# Patient Record
Sex: FEMALE | Race: WHITE | Employment: OTHER | URBAN - NONMETROPOLITAN AREA
[De-identification: names, ages, dates, MRNs, and addresses within clinical notes are randomized per-mention and may not be internally consistent; named-entity substitution may affect disease eponyms.]

---

## 2017-01-24 ENCOUNTER — TELEPHONE (OUTPATIENT)
Dept: FAMILY MEDICINE CLINIC | Age: 62
End: 2017-01-24

## 2017-01-24 DIAGNOSIS — Z00.00 WELL ADULT HEALTH CHECK: Primary | ICD-10-CM

## 2017-02-14 ENCOUNTER — OFFICE VISIT (OUTPATIENT)
Dept: FAMILY MEDICINE CLINIC | Age: 62
End: 2017-02-14

## 2017-02-14 VITALS
HEIGHT: 64 IN | WEIGHT: 257 LBS | HEART RATE: 88 BPM | DIASTOLIC BLOOD PRESSURE: 60 MMHG | BODY MASS INDEX: 43.87 KG/M2 | SYSTOLIC BLOOD PRESSURE: 140 MMHG

## 2017-02-14 DIAGNOSIS — R73.9 ELEVATED BLOOD SUGAR: Primary | ICD-10-CM

## 2017-02-14 DIAGNOSIS — E03.4 HYPOTHYROIDISM DUE TO ACQUIRED ATROPHY OF THYROID: ICD-10-CM

## 2017-02-14 DIAGNOSIS — F32.89 OTHER DEPRESSION: ICD-10-CM

## 2017-02-14 PROCEDURE — G8484 FLU IMMUNIZE NO ADMIN: HCPCS | Performed by: FAMILY MEDICINE

## 2017-02-14 PROCEDURE — 99213 OFFICE O/P EST LOW 20 MIN: CPT | Performed by: FAMILY MEDICINE

## 2017-02-14 PROCEDURE — 1036F TOBACCO NON-USER: CPT | Performed by: FAMILY MEDICINE

## 2017-02-14 PROCEDURE — G8417 CALC BMI ABV UP PARAM F/U: HCPCS | Performed by: FAMILY MEDICINE

## 2017-02-14 PROCEDURE — 3017F COLORECTAL CA SCREEN DOC REV: CPT | Performed by: FAMILY MEDICINE

## 2017-02-14 PROCEDURE — G8427 DOCREV CUR MEDS BY ELIG CLIN: HCPCS | Performed by: FAMILY MEDICINE

## 2017-02-14 PROCEDURE — 3014F SCREEN MAMMO DOC REV: CPT | Performed by: FAMILY MEDICINE

## 2017-02-14 ASSESSMENT — ENCOUNTER SYMPTOMS
SHORTNESS OF BREATH: 0
GASTROINTESTINAL NEGATIVE: 1
RESPIRATORY NEGATIVE: 1

## 2017-03-14 ENCOUNTER — OFFICE VISIT (OUTPATIENT)
Dept: FAMILY MEDICINE CLINIC | Age: 62
End: 2017-03-14

## 2017-03-14 VITALS
HEART RATE: 84 BPM | TEMPERATURE: 99.3 F | BODY MASS INDEX: 42.1 KG/M2 | WEIGHT: 246.6 LBS | DIASTOLIC BLOOD PRESSURE: 74 MMHG | HEIGHT: 64 IN | SYSTOLIC BLOOD PRESSURE: 136 MMHG

## 2017-03-14 DIAGNOSIS — R10.13 EPIGASTRIC PAIN: Primary | ICD-10-CM

## 2017-03-14 DIAGNOSIS — K29.50 CHRONIC GASTRITIS WITHOUT BLEEDING, UNSPECIFIED GASTRITIS TYPE: ICD-10-CM

## 2017-03-14 PROCEDURE — G8417 CALC BMI ABV UP PARAM F/U: HCPCS | Performed by: FAMILY MEDICINE

## 2017-03-14 PROCEDURE — 3014F SCREEN MAMMO DOC REV: CPT | Performed by: FAMILY MEDICINE

## 2017-03-14 PROCEDURE — G8484 FLU IMMUNIZE NO ADMIN: HCPCS | Performed by: FAMILY MEDICINE

## 2017-03-14 PROCEDURE — G8427 DOCREV CUR MEDS BY ELIG CLIN: HCPCS | Performed by: FAMILY MEDICINE

## 2017-03-14 PROCEDURE — 99213 OFFICE O/P EST LOW 20 MIN: CPT | Performed by: FAMILY MEDICINE

## 2017-03-14 PROCEDURE — 1036F TOBACCO NON-USER: CPT | Performed by: FAMILY MEDICINE

## 2017-03-14 PROCEDURE — 3017F COLORECTAL CA SCREEN DOC REV: CPT | Performed by: FAMILY MEDICINE

## 2017-03-14 RX ORDER — OMEPRAZOLE 40 MG/1
40 CAPSULE, DELAYED RELEASE ORAL DAILY
Qty: 30 CAPSULE | Refills: 1 | Status: SHIPPED | OUTPATIENT
Start: 2017-03-14 | End: 2017-09-19 | Stop reason: SDUPTHER

## 2017-03-21 ENCOUNTER — OFFICE VISIT (OUTPATIENT)
Dept: FAMILY MEDICINE CLINIC | Age: 62
End: 2017-03-21

## 2017-03-21 VITALS
HEART RATE: 72 BPM | BODY MASS INDEX: 41.66 KG/M2 | HEIGHT: 64 IN | DIASTOLIC BLOOD PRESSURE: 72 MMHG | WEIGHT: 244 LBS | SYSTOLIC BLOOD PRESSURE: 148 MMHG

## 2017-03-21 DIAGNOSIS — E03.4 HYPOTHYROIDISM DUE TO ACQUIRED ATROPHY OF THYROID: ICD-10-CM

## 2017-03-21 PROCEDURE — 3014F SCREEN MAMMO DOC REV: CPT | Performed by: FAMILY MEDICINE

## 2017-03-21 PROCEDURE — 3017F COLORECTAL CA SCREEN DOC REV: CPT | Performed by: FAMILY MEDICINE

## 2017-03-21 PROCEDURE — G8417 CALC BMI ABV UP PARAM F/U: HCPCS | Performed by: FAMILY MEDICINE

## 2017-03-21 PROCEDURE — 99213 OFFICE O/P EST LOW 20 MIN: CPT | Performed by: FAMILY MEDICINE

## 2017-03-21 PROCEDURE — G8427 DOCREV CUR MEDS BY ELIG CLIN: HCPCS | Performed by: FAMILY MEDICINE

## 2017-03-21 PROCEDURE — G8484 FLU IMMUNIZE NO ADMIN: HCPCS | Performed by: FAMILY MEDICINE

## 2017-03-21 PROCEDURE — 1036F TOBACCO NON-USER: CPT | Performed by: FAMILY MEDICINE

## 2017-03-21 RX ORDER — LEVOTHYROXINE AND LIOTHYRONINE 76; 18 UG/1; UG/1
120 TABLET ORAL DAILY
Qty: 90 TABLET | Refills: 1 | Status: SHIPPED | OUTPATIENT
Start: 2017-03-21 | End: 2017-08-24 | Stop reason: CLARIF

## 2017-03-21 RX ORDER — PAROXETINE HYDROCHLORIDE 40 MG/1
40 TABLET, FILM COATED ORAL EVERY MORNING
Qty: 100 TABLET | Refills: 1 | Status: SHIPPED | OUTPATIENT
Start: 2017-03-21 | End: 2017-09-19 | Stop reason: SDUPTHER

## 2017-03-21 ASSESSMENT — ENCOUNTER SYMPTOMS
GASTROINTESTINAL NEGATIVE: 1
RESPIRATORY NEGATIVE: 1
SHORTNESS OF BREATH: 0

## 2017-04-05 LAB
AVERAGE GLUCOSE: 99 MG/DL (ref 70–126)
HBA1C MFR BLD: 5.3 % (ref 4.4–6.4)

## 2017-08-23 ENCOUNTER — TELEPHONE (OUTPATIENT)
Dept: FAMILY MEDICINE CLINIC | Age: 62
End: 2017-08-23

## 2017-08-24 RX ORDER — LEVOTHYROXINE AND LIOTHYRONINE 76; 18 UG/1; UG/1
120 TABLET ORAL DAILY
Qty: 30 TABLET | Refills: 3 | Status: SHIPPED | OUTPATIENT
Start: 2017-08-24 | End: 2017-09-19 | Stop reason: SDUPTHER

## 2017-09-19 ENCOUNTER — OFFICE VISIT (OUTPATIENT)
Dept: FAMILY MEDICINE CLINIC | Age: 62
End: 2017-09-19
Payer: COMMERCIAL

## 2017-09-19 VITALS
WEIGHT: 257.4 LBS | BODY MASS INDEX: 43.94 KG/M2 | HEIGHT: 64 IN | HEART RATE: 88 BPM | SYSTOLIC BLOOD PRESSURE: 146 MMHG | DIASTOLIC BLOOD PRESSURE: 70 MMHG

## 2017-09-19 DIAGNOSIS — F32.5 MAJOR DEPRESSIVE DISORDER WITH SINGLE EPISODE, IN FULL REMISSION (HCC): ICD-10-CM

## 2017-09-19 DIAGNOSIS — E03.4 HYPOTHYROIDISM DUE TO ACQUIRED ATROPHY OF THYROID: Primary | ICD-10-CM

## 2017-09-19 PROCEDURE — 1036F TOBACCO NON-USER: CPT | Performed by: FAMILY MEDICINE

## 2017-09-19 PROCEDURE — 3017F COLORECTAL CA SCREEN DOC REV: CPT | Performed by: FAMILY MEDICINE

## 2017-09-19 PROCEDURE — 3014F SCREEN MAMMO DOC REV: CPT | Performed by: FAMILY MEDICINE

## 2017-09-19 PROCEDURE — G8417 CALC BMI ABV UP PARAM F/U: HCPCS | Performed by: FAMILY MEDICINE

## 2017-09-19 PROCEDURE — 99213 OFFICE O/P EST LOW 20 MIN: CPT | Performed by: FAMILY MEDICINE

## 2017-09-19 PROCEDURE — G8427 DOCREV CUR MEDS BY ELIG CLIN: HCPCS | Performed by: FAMILY MEDICINE

## 2017-09-19 RX ORDER — PAROXETINE HYDROCHLORIDE 40 MG/1
40 TABLET, FILM COATED ORAL EVERY MORNING
Qty: 100 TABLET | Refills: 1 | Status: SHIPPED | OUTPATIENT
Start: 2017-09-19 | End: 2018-03-20 | Stop reason: SDUPTHER

## 2017-09-19 RX ORDER — OMEPRAZOLE 40 MG/1
40 CAPSULE, DELAYED RELEASE ORAL DAILY
Qty: 30 CAPSULE | Refills: 1 | Status: SHIPPED | OUTPATIENT
Start: 2017-09-19 | End: 2018-09-18

## 2017-09-19 RX ORDER — LEVOTHYROXINE AND LIOTHYRONINE 76; 18 UG/1; UG/1
120 TABLET ORAL DAILY
Qty: 30 TABLET | Refills: 3 | Status: SHIPPED | OUTPATIENT
Start: 2017-09-19 | End: 2018-03-20 | Stop reason: SDUPTHER

## 2017-09-19 ASSESSMENT — PATIENT HEALTH QUESTIONNAIRE - PHQ9
SUM OF ALL RESPONSES TO PHQ9 QUESTIONS 1 & 2: 2
SUM OF ALL RESPONSES TO PHQ QUESTIONS 1-9: 2
2. FEELING DOWN, DEPRESSED OR HOPELESS: 1
1. LITTLE INTEREST OR PLEASURE IN DOING THINGS: 1

## 2017-09-19 ASSESSMENT — ENCOUNTER SYMPTOMS
RESPIRATORY NEGATIVE: 1
GASTROINTESTINAL NEGATIVE: 1
SHORTNESS OF BREATH: 0

## 2017-09-26 ENCOUNTER — TELEPHONE (OUTPATIENT)
Dept: FAMILY MEDICINE CLINIC | Age: 62
End: 2017-09-26

## 2017-09-26 LAB — TSH SERPL DL<=0.05 MIU/L-ACNC: 0.59 UIU/ML (ref 0.49–4.67)

## 2018-01-05 DIAGNOSIS — J32.9 CHRONIC SINUSITIS, UNSPECIFIED LOCATION: Primary | ICD-10-CM

## 2018-01-05 RX ORDER — PROMETHAZINE HYDROCHLORIDE AND CODEINE PHOSPHATE 6.25; 1 MG/5ML; MG/5ML
SYRUP ORAL
Qty: 120 ML | Refills: 0 | Status: SHIPPED | OUTPATIENT
Start: 2018-01-05 | End: 2018-01-19

## 2018-03-20 ENCOUNTER — OFFICE VISIT (OUTPATIENT)
Dept: FAMILY MEDICINE CLINIC | Age: 63
End: 2018-03-20
Payer: COMMERCIAL

## 2018-03-20 VITALS
SYSTOLIC BLOOD PRESSURE: 146 MMHG | HEART RATE: 80 BPM | WEIGHT: 262 LBS | HEIGHT: 64 IN | BODY MASS INDEX: 44.73 KG/M2 | DIASTOLIC BLOOD PRESSURE: 80 MMHG

## 2018-03-20 DIAGNOSIS — E03.4 HYPOTHYROIDISM DUE TO ACQUIRED ATROPHY OF THYROID: ICD-10-CM

## 2018-03-20 DIAGNOSIS — F32.5 MAJOR DEPRESSIVE DISORDER WITH SINGLE EPISODE, IN FULL REMISSION (HCC): ICD-10-CM

## 2018-03-20 PROCEDURE — G8484 FLU IMMUNIZE NO ADMIN: HCPCS | Performed by: FAMILY MEDICINE

## 2018-03-20 PROCEDURE — 1036F TOBACCO NON-USER: CPT | Performed by: FAMILY MEDICINE

## 2018-03-20 PROCEDURE — 99213 OFFICE O/P EST LOW 20 MIN: CPT | Performed by: FAMILY MEDICINE

## 2018-03-20 PROCEDURE — 3014F SCREEN MAMMO DOC REV: CPT | Performed by: FAMILY MEDICINE

## 2018-03-20 PROCEDURE — G8427 DOCREV CUR MEDS BY ELIG CLIN: HCPCS | Performed by: FAMILY MEDICINE

## 2018-03-20 PROCEDURE — G8417 CALC BMI ABV UP PARAM F/U: HCPCS | Performed by: FAMILY MEDICINE

## 2018-03-20 PROCEDURE — 3017F COLORECTAL CA SCREEN DOC REV: CPT | Performed by: FAMILY MEDICINE

## 2018-03-20 RX ORDER — LEVOTHYROXINE AND LIOTHYRONINE 76; 18 UG/1; UG/1
120 TABLET ORAL DAILY
Qty: 30 TABLET | Refills: 5 | Status: SHIPPED | OUTPATIENT
Start: 2018-03-20 | End: 2018-09-18 | Stop reason: SDUPTHER

## 2018-03-20 RX ORDER — PAROXETINE HYDROCHLORIDE 40 MG/1
40 TABLET, FILM COATED ORAL EVERY MORNING
Qty: 100 TABLET | Refills: 1 | Status: SHIPPED | OUTPATIENT
Start: 2018-03-20 | End: 2018-09-18 | Stop reason: SDUPTHER

## 2018-03-20 ASSESSMENT — ENCOUNTER SYMPTOMS
GASTROINTESTINAL NEGATIVE: 1
RESPIRATORY NEGATIVE: 1
SHORTNESS OF BREATH: 0

## 2018-09-18 ENCOUNTER — OFFICE VISIT (OUTPATIENT)
Dept: FAMILY MEDICINE CLINIC | Age: 63
End: 2018-09-18
Payer: COMMERCIAL

## 2018-09-18 VITALS
WEIGHT: 260 LBS | HEART RATE: 80 BPM | DIASTOLIC BLOOD PRESSURE: 68 MMHG | SYSTOLIC BLOOD PRESSURE: 152 MMHG | HEIGHT: 64 IN | BODY MASS INDEX: 44.39 KG/M2

## 2018-09-18 DIAGNOSIS — F32.5 MAJOR DEPRESSIVE DISORDER WITH SINGLE EPISODE, IN FULL REMISSION (HCC): ICD-10-CM

## 2018-09-18 DIAGNOSIS — E03.4 HYPOTHYROIDISM DUE TO ACQUIRED ATROPHY OF THYROID: Primary | ICD-10-CM

## 2018-09-18 PROCEDURE — G8417 CALC BMI ABV UP PARAM F/U: HCPCS | Performed by: FAMILY MEDICINE

## 2018-09-18 PROCEDURE — 1036F TOBACCO NON-USER: CPT | Performed by: FAMILY MEDICINE

## 2018-09-18 PROCEDURE — 99213 OFFICE O/P EST LOW 20 MIN: CPT | Performed by: FAMILY MEDICINE

## 2018-09-18 PROCEDURE — G8427 DOCREV CUR MEDS BY ELIG CLIN: HCPCS | Performed by: FAMILY MEDICINE

## 2018-09-18 PROCEDURE — 3017F COLORECTAL CA SCREEN DOC REV: CPT | Performed by: FAMILY MEDICINE

## 2018-09-18 RX ORDER — LEVOTHYROXINE AND LIOTHYRONINE 76; 18 UG/1; UG/1
120 TABLET ORAL DAILY
Qty: 30 TABLET | Refills: 5 | Status: SHIPPED | OUTPATIENT
Start: 2018-09-18 | End: 2019-02-18 | Stop reason: SDUPTHER

## 2018-09-18 RX ORDER — PAROXETINE HYDROCHLORIDE 40 MG/1
40 TABLET, FILM COATED ORAL EVERY MORNING
Qty: 100 TABLET | Refills: 1 | Status: SHIPPED | OUTPATIENT
Start: 2018-09-18 | End: 2019-03-18 | Stop reason: SDUPTHER

## 2018-09-18 ASSESSMENT — ENCOUNTER SYMPTOMS
SHORTNESS OF BREATH: 0
GASTROINTESTINAL NEGATIVE: 1
RESPIRATORY NEGATIVE: 1
ABDOMINAL PAIN: 0

## 2018-12-20 DIAGNOSIS — J32.9 CHRONIC SINUSITIS, UNSPECIFIED LOCATION: ICD-10-CM

## 2018-12-20 RX ORDER — PROMETHAZINE HYDROCHLORIDE AND CODEINE PHOSPHATE 6.25; 1 MG/5ML; MG/5ML
SYRUP ORAL
Qty: 120 ML | Refills: 0 | OUTPATIENT
Start: 2018-12-20 | End: 2019-01-03

## 2018-12-20 NOTE — TELEPHONE ENCOUNTER
Jazzy Nino called requesting a refill on the following medications:  Requested Prescriptions     Pending Prescriptions Disp Refills    promethazine-codeine (PHENERGAN WITH CODEINE) 6.25-10 MG/5ML syrup 120 mL 0     Si-2 tsp Q 6 Hours PRN cough. Pharmacy verified: ddd  . pv      Date of last visit:   Date of next visit (if applicable): 3/50/4839

## 2019-02-18 DIAGNOSIS — E03.4 HYPOTHYROIDISM DUE TO ACQUIRED ATROPHY OF THYROID: ICD-10-CM

## 2019-02-18 RX ORDER — LEVOTHYROXINE AND LIOTHYRONINE 76; 18 UG/1; UG/1
120 TABLET ORAL DAILY
Qty: 30 TABLET | Refills: 5 | Status: SHIPPED | OUTPATIENT
Start: 2019-02-18 | End: 2019-03-18 | Stop reason: SDUPTHER

## 2019-03-18 ENCOUNTER — TELEPHONE (OUTPATIENT)
Dept: FAMILY MEDICINE CLINIC | Age: 64
End: 2019-03-18

## 2019-03-18 ENCOUNTER — OFFICE VISIT (OUTPATIENT)
Dept: FAMILY MEDICINE CLINIC | Age: 64
End: 2019-03-18
Payer: COMMERCIAL

## 2019-03-18 VITALS
BODY MASS INDEX: 44.22 KG/M2 | HEART RATE: 84 BPM | HEIGHT: 64 IN | WEIGHT: 259 LBS | SYSTOLIC BLOOD PRESSURE: 146 MMHG | DIASTOLIC BLOOD PRESSURE: 72 MMHG

## 2019-03-18 DIAGNOSIS — F32.5 MAJOR DEPRESSIVE DISORDER WITH SINGLE EPISODE, IN FULL REMISSION (HCC): ICD-10-CM

## 2019-03-18 DIAGNOSIS — E03.4 HYPOTHYROIDISM DUE TO ACQUIRED ATROPHY OF THYROID: ICD-10-CM

## 2019-03-18 LAB — TSH SERPL DL<=0.05 MIU/L-ACNC: 1.84 UIU/ML (ref 0.4–4.2)

## 2019-03-18 PROCEDURE — 36415 COLL VENOUS BLD VENIPUNCTURE: CPT | Performed by: FAMILY MEDICINE

## 2019-03-18 PROCEDURE — 99213 OFFICE O/P EST LOW 20 MIN: CPT | Performed by: FAMILY MEDICINE

## 2019-03-18 RX ORDER — PAROXETINE HYDROCHLORIDE 40 MG/1
40 TABLET, FILM COATED ORAL EVERY MORNING
Qty: 100 TABLET | Refills: 1 | Status: SHIPPED | OUTPATIENT
Start: 2019-03-18 | End: 2019-09-16 | Stop reason: SDUPTHER

## 2019-03-18 RX ORDER — NEOMYCIN SULFATE, POLYMYXIN B SULFATE, HYDROCORTISONE 3.5; 10000; 1 MG/ML; [USP'U]/ML; MG/ML
1 SOLUTION/ DROPS AURICULAR (OTIC) EVERY 8 HOURS PRN
Qty: 1 BOTTLE | Refills: 1 | Status: SHIPPED | OUTPATIENT
Start: 2019-03-18 | End: 2020-06-10 | Stop reason: SDUPTHER

## 2019-03-18 RX ORDER — LEVOTHYROXINE AND LIOTHYRONINE 76; 18 UG/1; UG/1
120 TABLET ORAL DAILY
Qty: 30 TABLET | Refills: 5 | Status: SHIPPED | OUTPATIENT
Start: 2019-03-18 | End: 2019-09-16 | Stop reason: SDUPTHER

## 2019-03-18 ASSESSMENT — PATIENT HEALTH QUESTIONNAIRE - PHQ9
SUM OF ALL RESPONSES TO PHQ QUESTIONS 1-9: 0
1. LITTLE INTEREST OR PLEASURE IN DOING THINGS: 0
SUM OF ALL RESPONSES TO PHQ9 QUESTIONS 1 & 2: 0
2. FEELING DOWN, DEPRESSED OR HOPELESS: 0
SUM OF ALL RESPONSES TO PHQ QUESTIONS 1-9: 0

## 2019-03-18 ASSESSMENT — ENCOUNTER SYMPTOMS
GASTROINTESTINAL NEGATIVE: 1
SHORTNESS OF BREATH: 0
RESPIRATORY NEGATIVE: 1

## 2019-04-22 ENCOUNTER — OFFICE VISIT (OUTPATIENT)
Dept: FAMILY MEDICINE CLINIC | Age: 64
End: 2019-04-22
Payer: COMMERCIAL

## 2019-04-22 VITALS
WEIGHT: 261.4 LBS | DIASTOLIC BLOOD PRESSURE: 70 MMHG | HEIGHT: 64 IN | SYSTOLIC BLOOD PRESSURE: 142 MMHG | BODY MASS INDEX: 44.63 KG/M2 | TEMPERATURE: 98.2 F | HEART RATE: 80 BPM

## 2019-04-22 DIAGNOSIS — J01.90 ACUTE BACTERIAL SINUSITIS: Primary | ICD-10-CM

## 2019-04-22 DIAGNOSIS — B96.89 ACUTE BACTERIAL SINUSITIS: Primary | ICD-10-CM

## 2019-04-22 PROCEDURE — 99213 OFFICE O/P EST LOW 20 MIN: CPT | Performed by: FAMILY MEDICINE

## 2019-04-22 RX ORDER — CLINDAMYCIN HYDROCHLORIDE 300 MG/1
300 CAPSULE ORAL 2 TIMES DAILY
Qty: 20 CAPSULE | Refills: 0 | Status: SHIPPED | OUTPATIENT
Start: 2019-04-22 | End: 2019-05-02

## 2019-04-23 NOTE — PROGRESS NOTES
Name:  Searcy Phalen  :    1955    Chief Complaint   Patient presents with    Sinusitis     with cough for 12 days       HPI:  URI followed by head congestion and cough. No SOB. No fever. Miserable. Affecting sleep. Physical Exam:      BP (!) 142/70   Pulse 80   Temp 98.2 °F (36.8 °C) (Oral)   Ht 5' 4\" (1.626 m)   Wt 261 lb 6.4 oz (118.6 kg)   BMI 44.87 kg/m²     Not ill. ENT:   TM's clear. Phar is red with edema. No nodes. Lungs are clear. Heart in RRR with no murmur. Assessment/Plan:      Sinusitis    Cleocin    There are no diagnoses linked to this encounter.

## 2019-08-06 ENCOUNTER — OFFICE VISIT (OUTPATIENT)
Dept: FAMILY MEDICINE CLINIC | Age: 64
End: 2019-08-06
Payer: COMMERCIAL

## 2019-08-06 VITALS
HEIGHT: 64 IN | WEIGHT: 261 LBS | SYSTOLIC BLOOD PRESSURE: 138 MMHG | HEART RATE: 89 BPM | DIASTOLIC BLOOD PRESSURE: 86 MMHG | TEMPERATURE: 99.4 F | BODY MASS INDEX: 44.56 KG/M2

## 2019-08-06 DIAGNOSIS — R39.9 UTI SYMPTOMS: Primary | ICD-10-CM

## 2019-08-06 DIAGNOSIS — R31.29 OTHER MICROSCOPIC HEMATURIA: ICD-10-CM

## 2019-08-06 LAB
BILIRUBIN, POC: NEGATIVE
BLOOD URINE, POC: NORMAL
CLARITY, POC: CLEAR
COLOR, POC: YELLOW
GLUCOSE URINE, POC: NEGATIVE
KETONES, POC: NEGATIVE
LEUKOCYTE EST, POC: NEGATIVE
NITRITE, POC: NEGATIVE
PH, POC: 5
PROTEIN, POC: NEGATIVE
SPECIFIC GRAVITY, POC: 1.02
UROBILINOGEN, POC: 1

## 2019-08-06 PROCEDURE — 99213 OFFICE O/P EST LOW 20 MIN: CPT | Performed by: FAMILY MEDICINE

## 2019-08-06 PROCEDURE — 81003 URINALYSIS AUTO W/O SCOPE: CPT | Performed by: FAMILY MEDICINE

## 2019-08-06 NOTE — PROGRESS NOTES
[facility]  [address] [facility]  Dept: [phone]  Dept Fax: [phone]  Loc: [phone]  PROGRESS NOTE      Visit Date: 8/6/2019    Chris Fajardo is a 58 y.o. female who presents today for:  Chief Complaint   Patient presents with    Urinary Tract Infection     odor for 1 month now and urgency for 3 days       Subjective:  HPI  New problems:   Urinary odor for 1 month and urinary urgency for 3 days:  She feels a pain in right inguinal area. She reports she currently does not have any pain in the right inguinal area currently. Her urgency and frequency and dysuria symptoms are intermittent. She denies fever and chills. She denies genital skin lesions    She wants eczema in her ear checked. Review of Systems   Constitutional: Positive for fatigue. Negative for chills and fever. Genitourinary: Positive for dysuria, frequency and urgency. Past Medical History:   Diagnosis Date    Depression     Endometrial cancer ([facility] [address]     Hypothyroidism     Iron deficiency anemia 02/2002 to 12/2002    Irritable bowel syndrome     Lactose intolerance 2000    Murmur     Neurodermatitis 07/2000    with exzema    Prediabetes       Current Outpatient Medications   Medication Sig Dispense Refill    thyroid (NP THYROID) 120 MG tablet Take 1 tablet by mouth daily 30 tablet 5    PARoxetine (PAXIL) 40 MG tablet Take 1 tablet by mouth every morning 100 tablet 1     No current facility-administered medications for this visit.       Allergies   Allergen Reactions    Ceclor [Cefaclor] Diarrhea    Cleocin [Clindamycin Hcl] Diarrhea    Penicillins Nausea Only     Stomach pain like ulcer pain    Sulfa Antibiotics Diarrhea    Vibramycin [Doxycycline Calcium] Diarrhea       Objective:     /86 (Site: Left Upper Arm, Position: Sitting, Cuff Size: Large Adult)   Pulse 89   Temp 99.4 °F (37.4 °C) (Oral)   Ht 5' 4\" (1.626 m) Wt 261 lb (118.4 kg)   BMI 44.80 kg/m²   Physical Exam   Constitutional: She is oriented to person, place, and time. She appears well-developed and well-nourished. No distress. Cardiovascular: Normal rate and regular rhythm. Murmur heard. Pulmonary/Chest: Effort normal and breath sounds normal.   Abdominal: Soft. There is no tenderness. There is no rigidity and no CVA tenderness. Neurological: She is alert and oriented to person, place, and time. Vitals reviewed. Right ear canal is mostly obstructed with cerumen. A little bit of dried skin within both ear canals. Results for Sneha Estimable (MRN [id number]) as of 8/6/2019 15:09   Ref. Range 8/6/2019 14:52   Protein, UA Unknown negative   Color, UA Unknown yellow   Clarity, UA Unknown clear   Glucose, UA POC Unknown negative   Bilirubin, UA Unknown negative   Ketones, UA Unknown negative   Spec Grav, UA Unknown 1.025   pH, UA Unknown 5.0   Urobilinogen, UA Unknown 1.0   Nitrite, UA Unknown negative   Leukocytes, UA Unknown negative   Blood, UA POC Unknown large       Impression/Plan:  1. UTI symptoms  UTI symptoms which has been ongoing. Needed for infection. She does have a large amount of blood on the urinalysis which is essentially asymptomatic.  - POCT Urinalysis No Micro (Auto)    2. Other microscopic hematuria  Asymptomatic hematuria. We discussed differential diagnoses which include kidney stone but she has no pain, kidney cancer, bladder cancer, etc.  We will repeat the urinalysis next week and if hematuria persist, we will consider imaging.  - Urinalysis With Microscopic; Future      They voiced understanding. All questions answered. They agreed with treatment plan. See patient instructions for any educational materials that may have been given. Discussed use, benefit, and side effects of prescribed medications.        (Please note that portions of this note may have been completed with a voice recognition program.  Efforts were made

## 2019-08-22 ENCOUNTER — HOSPITAL ENCOUNTER (OUTPATIENT)
Age: 64
Discharge: HOME | End: 2019-08-22
Payer: COMMERCIAL

## 2019-08-22 ENCOUNTER — TELEPHONE (OUTPATIENT)
Dept: FAMILY MEDICINE CLINIC | Age: 64
End: 2019-08-22

## 2019-08-22 DIAGNOSIS — R31.29 OTHER MICROSCOPIC HEMATURIA: ICD-10-CM

## 2019-08-22 DIAGNOSIS — R73.09 ELEVATED GLUCOSE: Primary | ICD-10-CM

## 2019-08-22 LAB
BACTERIA: ABNORMAL
BILIRUBIN URINE: NEGATIVE
BLOOD, URINE: NEGATIVE
CASTS: ABNORMAL /LPF
CASTS: ABNORMAL /LPF
CHARACTER, URINE: CLEAR
COLOR: ABNORMAL
CRYSTALS: ABNORMAL
EPITHELIAL CELLS, UA: ABNORMAL /HPF
GLUCOSE, URINE: >= 1000 MG/DL
KETONES, URINE: NEGATIVE
LEUKOCYTE ESTERASE, URINE: NEGATIVE
MISCELLANEOUS LAB TEST RESULT: ABNORMAL
NITRITE, URINE: NEGATIVE
PH UA: 6 (ref 5–9)
PROTEIN UA: NEGATIVE MG/DL
RBC URINE: ABNORMAL /HPF
RENAL EPITHELIAL, UA: ABNORMAL
SPECIFIC GRAVITY UA: 1.03 (ref 1–1.03)
UROBILINOGEN, URINE: 1 EU/DL (ref 0–1)
WBC UA: ABNORMAL /HPF
YEAST: ABNORMAL

## 2019-08-22 PROCEDURE — 81001 URINALYSIS AUTO W/SCOPE: CPT

## 2019-09-16 ENCOUNTER — OFFICE VISIT (OUTPATIENT)
Dept: FAMILY MEDICINE CLINIC | Age: 64
End: 2019-09-16
Payer: MEDICARE

## 2019-09-16 VITALS
HEIGHT: 64 IN | HEART RATE: 88 BPM | SYSTOLIC BLOOD PRESSURE: 152 MMHG | BODY MASS INDEX: 43.81 KG/M2 | WEIGHT: 256.6 LBS | DIASTOLIC BLOOD PRESSURE: 72 MMHG

## 2019-09-16 DIAGNOSIS — R73.09 ELEVATED GLUCOSE: Primary | ICD-10-CM

## 2019-09-16 DIAGNOSIS — R01.1 MURMUR: Chronic | ICD-10-CM

## 2019-09-16 DIAGNOSIS — E03.4 HYPOTHYROIDISM DUE TO ACQUIRED ATROPHY OF THYROID: ICD-10-CM

## 2019-09-16 DIAGNOSIS — F32.5 MAJOR DEPRESSIVE DISORDER WITH SINGLE EPISODE, IN FULL REMISSION (HCC): ICD-10-CM

## 2019-09-16 DIAGNOSIS — R53.82 CHRONIC FATIGUE: ICD-10-CM

## 2019-09-16 DIAGNOSIS — Z23 NEED FOR PROPHYLACTIC VACCINATION AGAINST STREPTOCOCCUS PNEUMONIAE (PNEUMOCOCCUS): ICD-10-CM

## 2019-09-16 PROCEDURE — 90670 PCV13 VACCINE IM: CPT | Performed by: FAMILY MEDICINE

## 2019-09-16 PROCEDURE — G0009 ADMIN PNEUMOCOCCAL VACCINE: HCPCS | Performed by: FAMILY MEDICINE

## 2019-09-16 PROCEDURE — 99214 OFFICE O/P EST MOD 30 MIN: CPT | Performed by: FAMILY MEDICINE

## 2019-09-16 RX ORDER — PAROXETINE HYDROCHLORIDE 40 MG/1
40 TABLET, FILM COATED ORAL EVERY MORNING
Qty: 100 TABLET | Refills: 1 | Status: SHIPPED | OUTPATIENT
Start: 2019-09-16 | End: 2020-01-27 | Stop reason: SDUPTHER

## 2019-09-16 RX ORDER — LEVOTHYROXINE AND LIOTHYRONINE 76; 18 UG/1; UG/1
120 TABLET ORAL DAILY
Qty: 30 TABLET | Refills: 5 | Status: SHIPPED | OUTPATIENT
Start: 2019-09-16 | End: 2019-12-11 | Stop reason: SDUPTHER

## 2019-09-16 ASSESSMENT — ENCOUNTER SYMPTOMS
RESPIRATORY NEGATIVE: 1
GASTROINTESTINAL NEGATIVE: 1
BACK PAIN: 0
SHORTNESS OF BREATH: 0
ABDOMINAL PAIN: 0

## 2019-09-16 NOTE — PROGRESS NOTES
[facility]  [facility]  [address] [facility]  Dept: [phone]  Dept Fax: [phone]: [phone]    Visit Date: 9/16/2019    Martha Stein is a 71 y. o.female who presents today for:  Chief Complaint   Patient presents with    6 Month Follow-Up    Hypothyroidism    Depression         HPI:     She now has Medicare. She is more willing to get labs and necessary testing. We have wants labs and ECHO for many months. No thyroid symptoms. No weight changes, tremors, sweats or diarrhea. Always complains of being \"tired\". But function is good. Paxil manages Psych symptoms well. No side effects. Maybe some weight gain.            Past Medical History:   Diagnosis Date    Depression     Endometrial cancer ([address]     Hypothyroidism     Iron deficiency anemia 02/2002 to 12/2002    Irritable bowel syndrome     Lactose intolerance 2000    Murmur     Neurodermatitis 07/2000    with exzema    Prediabetes       Past Surgical History:   Procedure Laterality Date    CYST REMOVAL  1977    left ovary     DENTAL SURGERY  unsure of date    Jonathan Boston DENTAL SURGERY  03/01/2017    dental extractions     ENDOSCOPY, COLON, DIAGNOSTIC  7/24/14    Dr. Justice Miller  11/2010    Dr. Ama Ramos    left    [address]  11/2010    Dr. Kenji Brantley       Family History   Problem Relation Age of Onset    Alzheimer's Disease Mother     Cancer Mother         breast    High Blood Pressure Mother     Cancer Father         lung    Cancer Sister         breast    ADHD Sister     Diabetes Maternal Grandmother     Heart Disease Sister         CABG    Stroke Neg Hx        Social History     Tobacco Use    Smoking status: Never Smoker    Smokeless tobacco: Never Used   Substance Use Topics    Alcohol use: No     Alcohol/week: 0.0 standard drinks      Current Outpatient Medications   Medication Sig Dispense Refill    thyroid (NP THYROID) 120 MG tablet Take 1 tablet by mouth daily 30 tablet 5    PARoxetine (PAXIL) 40 MG tablet Take 1 tablet by mouth every morning 100 tablet 1     No current facility-administered medications for this visit. Allergies   Allergen Reactions    Ceclor [Cefaclor] Diarrhea    Cleocin [Clindamycin Hcl] Diarrhea    Penicillins Nausea Only     Stomach pain like ulcer pain    Sulfa Antibiotics Diarrhea    Vibramycin [Doxycycline Calcium] Diarrhea       Health Maintenance   Topic Date Due    HIV screen  09/04/1969    DTaP/Tdap/Td vaccine (1 - Tdap) 09/04/1973    A1C test (Diabetic or Prediabetic)  04/05/2018    Breast cancer screen  02/08/2019    Flu vaccine (1) 09/01/2019    DEXA (modify frequency per FRAX score)  09/04/2019    TSH testing  03/18/2020    Pneumococcal 65+ years Vaccine (2 of 2 - PPSV23) 09/16/2020    Lipid screen  11/08/2021    Colon cancer screen colonoscopy  07/24/2024    Shingles Vaccine  Completed    Hepatitis C screen  Completed       Subjective:     Review of Systems   Constitutional: Negative. Negative for activity change, appetite change, fever and unexpected weight change. HENT: Negative. Respiratory: Negative. Negative for shortness of breath. Cardiovascular: Negative. Negative for chest pain, palpitations and leg swelling. Gastrointestinal: Negative. Negative for abdominal pain. Genitourinary: Negative. Negative for dysuria, frequency and urgency. Musculoskeletal: Negative. Negative for back pain. Skin: Negative. Neurological: Negative. Negative for seizures, syncope and headaches. Hematological: Negative. Psychiatric/Behavioral: Negative for dysphoric mood. The patient is nervous/anxious. Objective:     Physical Exam   Constitutional: She is oriented to person, place, and time. She appears well-developed and well-nourished. Neck: Normal range of motion. Neck supple.  No Standing Expiration Date:   9/15/2020    Comprehensive Metabolic Panel     Standing Status:   Future     Standing Expiration Date:   9/15/2020    Lipid Panel     Standing Status:   Future     Standing Expiration Date:   9/15/2020     Order Specific Question:   Is Patient Fasting?/# of Hours     Answer:   12 hours    Hemoglobin A1C     Standing Status:   Future     Standing Expiration Date:   9/15/2020    TSH without Reflex     Standing Status:   Future     Standing Expiration Date:   9/15/2020    ECHO Complete 2D W Doppler W Color     Standing Status:   Future     Standing Expiration Date:   10/16/2019     Order Specific Question:   Reason for exam:     Answer:   Harsh murmur     Medications Prescribed:  Orders Placed This Encounter   Medications    thyroid (NP THYROID) 120 MG tablet     Sig: Take 1 tablet by mouth daily     Dispense:  30 tablet     Refill:  5    PARoxetine (PAXIL) 40 MG tablet     Sig: Take 1 tablet by mouth every morning     Dispense:  100 tablet     Refill:  1         Electronically signed by Bere Yousif MD on 9/16/2019 at 12:24 PM

## 2019-09-19 ENCOUNTER — TELEPHONE (OUTPATIENT)
Dept: FAMILY MEDICINE CLINIC | Age: 64
End: 2019-09-19

## 2019-09-19 ENCOUNTER — HOSPITAL ENCOUNTER (OUTPATIENT)
Dept: NON INVASIVE DIAGNOSTICS | Age: 64
Discharge: HOME | End: 2019-09-19
Payer: MEDICARE

## 2019-09-19 DIAGNOSIS — R01.1 MURMUR: Chronic | ICD-10-CM

## 2019-09-19 LAB
LV EF: 55 %
LVEF MODALITY: NORMAL

## 2019-09-19 PROCEDURE — 93306 TTE W/DOPPLER COMPLETE: CPT

## 2019-09-19 NOTE — TELEPHONE ENCOUNTER
----- Message from Dirk Ledbetter MD sent at 9/19/2019  4:34 PM EDT -----  ECHO show mild aortic stenosis. Needs to be followed over the years.

## 2019-09-20 ENCOUNTER — NURSE ONLY (OUTPATIENT)
Dept: FAMILY MEDICINE CLINIC | Age: 64
End: 2019-09-20
Payer: MEDICARE

## 2019-09-20 ENCOUNTER — TELEPHONE (OUTPATIENT)
Dept: FAMILY MEDICINE CLINIC | Age: 64
End: 2019-09-20

## 2019-09-20 DIAGNOSIS — R73.09 ELEVATED GLUCOSE: ICD-10-CM

## 2019-09-20 DIAGNOSIS — R53.82 CHRONIC FATIGUE: ICD-10-CM

## 2019-09-20 DIAGNOSIS — E03.4 HYPOTHYROIDISM DUE TO ACQUIRED ATROPHY OF THYROID: ICD-10-CM

## 2019-09-20 LAB
ALBUMIN SERPL-MCNC: 4.1 G/DL (ref 3.5–5.1)
ALP BLD-CCNC: 90 U/L (ref 38–126)
ALT SERPL-CCNC: 15 U/L (ref 11–66)
ANION GAP SERPL CALCULATED.3IONS-SCNC: 11 MEQ/L (ref 8–16)
AST SERPL-CCNC: 17 U/L (ref 5–40)
AVERAGE GLUCOSE: 141 MG/DL (ref 70–126)
BASOPHILS # BLD: 1.2 %
BASOPHILS ABSOLUTE: 0.1 THOU/MM3 (ref 0–0.1)
BILIRUB SERPL-MCNC: 0.6 MG/DL (ref 0.3–1.2)
BUN BLDV-MCNC: 12 MG/DL (ref 7–22)
CALCIUM SERPL-MCNC: 9.8 MG/DL (ref 8.5–10.5)
CHLORIDE BLD-SCNC: 104 MEQ/L (ref 98–111)
CHOLESTEROL, TOTAL: 165 MG/DL (ref 100–199)
CO2: 26 MEQ/L (ref 23–33)
CREAT SERPL-MCNC: 0.9 MG/DL (ref 0.4–1.2)
EOSINOPHIL # BLD: 3.8 %
EOSINOPHILS ABSOLUTE: 0.3 THOU/MM3 (ref 0–0.4)
ERYTHROCYTE [DISTWIDTH] IN BLOOD BY AUTOMATED COUNT: 12.5 % (ref 11.5–14.5)
ERYTHROCYTE [DISTWIDTH] IN BLOOD BY AUTOMATED COUNT: 39.7 FL (ref 35–45)
GFR SERPL CREATININE-BSD FRML MDRD: 63 ML/MIN/1.73M2
GLUCOSE BLD-MCNC: 151 MG/DL (ref 70–108)
HBA1C MFR BLD: 6.7 % (ref 4.4–6.4)
HCT VFR BLD CALC: 45.2 % (ref 37–47)
HDLC SERPL-MCNC: 41 MG/DL
HEMOGLOBIN: 15 GM/DL (ref 12–16)
IMMATURE GRANS (ABS): 0.03 THOU/MM3 (ref 0–0.07)
IMMATURE GRANULOCYTES: 0 %
LDL CHOLESTEROL CALCULATED: 102 MG/DL
LYMPHOCYTES # BLD: 20.6 %
LYMPHOCYTES ABSOLUTE: 1.6 THOU/MM3 (ref 1–4.8)
MCH RBC QN AUTO: 29 PG (ref 26–33)
MCHC RBC AUTO-ENTMCNC: 33.2 GM/DL (ref 32.2–35.5)
MCV RBC AUTO: 87.3 FL (ref 81–99)
MONOCYTES # BLD: 9.2 %
MONOCYTES ABSOLUTE: 0.7 THOU/MM3 (ref 0.4–1.3)
NUCLEATED RED BLOOD CELLS: 0 /100 WBC
PLATELET # BLD: 310 THOU/MM3 (ref 130–400)
PMV BLD AUTO: 10.2 FL (ref 9.4–12.4)
POTASSIUM SERPL-SCNC: 4.1 MEQ/L (ref 3.5–5.2)
RBC # BLD: 5.18 MILL/MM3 (ref 4.2–5.4)
SEG NEUTROPHILS: 64.8 %
SEGMENTED NEUTROPHILS ABSOLUTE COUNT: 5.1 THOU/MM3 (ref 1.8–7.7)
SODIUM BLD-SCNC: 141 MEQ/L (ref 135–145)
TOTAL PROTEIN: 7.3 G/DL (ref 6.1–8)
TRIGL SERPL-MCNC: 108 MG/DL (ref 0–199)
TSH SERPL DL<=0.05 MIU/L-ACNC: 2.24 UIU/ML (ref 0.4–4.2)
WBC # BLD: 7.8 THOU/MM3 (ref 4.8–10.8)

## 2019-09-20 PROCEDURE — 36415 COLL VENOUS BLD VENIPUNCTURE: CPT | Performed by: FAMILY MEDICINE

## 2019-09-23 ENCOUNTER — TELEPHONE (OUTPATIENT)
Dept: FAMILY MEDICINE CLINIC | Age: 64
End: 2019-09-23

## 2019-10-03 ENCOUNTER — TELEPHONE (OUTPATIENT)
Dept: FAMILY MEDICINE CLINIC | Age: 64
End: 2019-10-03

## 2019-11-15 DIAGNOSIS — R73.03 PREDIABETES: Chronic | ICD-10-CM

## 2019-11-15 DIAGNOSIS — R73.09 ELEVATED GLUCOSE: Primary | ICD-10-CM

## 2019-11-15 RX ORDER — BLOOD-GLUCOSE METER
EACH MISCELLANEOUS
COMMUNITY
End: 2019-11-15 | Stop reason: SDUPTHER

## 2019-11-15 RX ORDER — BLOOD-GLUCOSE METER
EACH MISCELLANEOUS
Qty: 1 KIT | Refills: 0 | Status: SHIPPED | OUTPATIENT
Start: 2019-11-15

## 2019-12-11 DIAGNOSIS — E03.4 HYPOTHYROIDISM DUE TO ACQUIRED ATROPHY OF THYROID: ICD-10-CM

## 2019-12-11 RX ORDER — LEVOTHYROXINE AND LIOTHYRONINE 76; 18 UG/1; UG/1
120 TABLET ORAL DAILY
Qty: 90 TABLET | Refills: 1 | Status: SHIPPED | OUTPATIENT
Start: 2019-12-11 | End: 2020-01-27 | Stop reason: SDUPTHER

## 2020-01-27 ENCOUNTER — OFFICE VISIT (OUTPATIENT)
Dept: FAMILY MEDICINE CLINIC | Age: 65
End: 2020-01-27
Payer: COMMERCIAL

## 2020-01-27 VITALS
HEART RATE: 96 BPM | BODY MASS INDEX: 43.98 KG/M2 | WEIGHT: 257.6 LBS | HEIGHT: 64 IN | DIASTOLIC BLOOD PRESSURE: 62 MMHG | SYSTOLIC BLOOD PRESSURE: 132 MMHG

## 2020-01-27 PROCEDURE — 99213 OFFICE O/P EST LOW 20 MIN: CPT | Performed by: FAMILY MEDICINE

## 2020-01-27 RX ORDER — LEVOTHYROXINE AND LIOTHYRONINE 76; 18 UG/1; UG/1
120 TABLET ORAL DAILY
Qty: 90 TABLET | Refills: 3 | Status: SHIPPED | OUTPATIENT
Start: 2020-01-27 | End: 2020-10-02 | Stop reason: SDUPTHER

## 2020-01-27 RX ORDER — PAROXETINE HYDROCHLORIDE 40 MG/1
40 TABLET, FILM COATED ORAL EVERY MORNING
Qty: 90 TABLET | Refills: 3 | Status: SHIPPED | OUTPATIENT
Start: 2020-01-27 | End: 2020-12-02 | Stop reason: SDUPTHER

## 2020-01-27 RX ORDER — DICLOFENAC SODIUM 75 MG/1
75 TABLET, DELAYED RELEASE ORAL 2 TIMES DAILY
Qty: 60 TABLET | Refills: 1 | Status: SHIPPED | OUTPATIENT
Start: 2020-01-27 | End: 2020-08-31

## 2020-01-27 SDOH — ECONOMIC STABILITY: FOOD INSECURITY: WITHIN THE PAST 12 MONTHS, THE FOOD YOU BOUGHT JUST DIDN'T LAST AND YOU DIDN'T HAVE MONEY TO GET MORE.: NEVER TRUE

## 2020-01-27 SDOH — ECONOMIC STABILITY: TRANSPORTATION INSECURITY
IN THE PAST 12 MONTHS, HAS THE LACK OF TRANSPORTATION KEPT YOU FROM MEDICAL APPOINTMENTS OR FROM GETTING MEDICATIONS?: NO

## 2020-01-27 SDOH — ECONOMIC STABILITY: FOOD INSECURITY: WITHIN THE PAST 12 MONTHS, YOU WORRIED THAT YOUR FOOD WOULD RUN OUT BEFORE YOU GOT MONEY TO BUY MORE.: NEVER TRUE

## 2020-01-27 SDOH — ECONOMIC STABILITY: INCOME INSECURITY: HOW HARD IS IT FOR YOU TO PAY FOR THE VERY BASICS LIKE FOOD, HOUSING, MEDICAL CARE, AND HEATING?: NOT VERY HARD

## 2020-01-27 SDOH — ECONOMIC STABILITY: TRANSPORTATION INSECURITY
IN THE PAST 12 MONTHS, HAS LACK OF TRANSPORTATION KEPT YOU FROM MEETINGS, WORK, OR FROM GETTING THINGS NEEDED FOR DAILY LIVING?: NO

## 2020-01-27 ASSESSMENT — ENCOUNTER SYMPTOMS
GASTROINTESTINAL NEGATIVE: 1
RESPIRATORY NEGATIVE: 1
SHORTNESS OF BREATH: 0
BACK PAIN: 1
ABDOMINAL PAIN: 0

## 2020-01-27 NOTE — PROGRESS NOTES
[facility]  [address] [facility]  Dept: [phone]  Dept Fax: [phone]: [phone]    Visit Date: 1/27/2020    Kiana Maria is a 71 y. o.female who presents today for:   Chief Complaint   Patient presents with    Other     pain near tailbone area         HPI:      Having few weeks of buttock pain when sits. Better walking. No numbness or leg pain. No treatment. No thyroid symptoms. No weight changes, tremors, sweats or diarrhea. Current Outpatient Medications   Medication Sig Dispense Refill    thyroid (NP THYROID) 120 MG tablet Take 1 tablet by mouth daily 90 tablet 3    PARoxetine (PAXIL) 40 MG tablet Take 1 tablet by mouth every morning 90 tablet 3    diclofenac (VOLTAREN) 75 MG EC tablet Take 1 tablet by mouth 2 times daily 60 tablet 1    Blood Glucose Monitoring Suppl (ONE TOUCH ULTRA 2) w/Device KIT One Touch Ultra 2 glucometer. Tests once daily. DX:R73.09 1 kit 0    ONE TOUCH LANCETS MISC One Touch Lancets. Uses once daily. DX:R73.09 100 each 3    blood glucose test strips (ASCENSIA AUTODISC VI;ONE TOUCH ULTRA TEST VI) strip One Touch Ultra 2 testing strips. Use once daily. DX:R73.09 100 each 3     No current facility-administered medications for this visit. The patient is allergic to ceclor [cefaclor]; cleocin [clindamycin hcl]; penicillins; sulfa antibiotics; and vibramycin [doxycycline calcium]. Subjective:      Review of Systems   Constitutional: Positive for activity change. Negative for appetite change, fever and unexpected weight change. HENT: Negative. Respiratory: Negative. Negative for shortness of breath. Cardiovascular: Negative. Negative for chest pain. Gastrointestinal: Negative. Negative for abdominal pain. Genitourinary: Negative. Musculoskeletal: Positive for back pain and myalgias. Skin: Negative. Neurological: Negative.

## 2020-02-17 ENCOUNTER — PATIENT MESSAGE (OUTPATIENT)
Dept: FAMILY MEDICINE CLINIC | Age: 65
End: 2020-02-17

## 2020-02-17 NOTE — TELEPHONE ENCOUNTER
From: Kiana Maria  To: Pancho Carroll MD  Sent: 2/17/2020 8:20 AM EST  Subject: Prescription Question    the Zonia Grieves, i was taking, gave me diarrhea. is there another scrip, i can try for '' bursitis'' pain ? farideh cevallos [phone].  September@[employer].

## 2020-03-17 PROBLEM — Z85.42 H/O MALIGNANT NEOPLASM OF ENDOMETRIUM: Status: ACTIVE | Noted: 2020-03-17

## 2020-06-10 RX ORDER — NEOMYCIN SULFATE, POLYMYXIN B SULFATE, HYDROCORTISONE 3.5; 10000; 1 MG/ML; [USP'U]/ML; MG/ML
1 SOLUTION/ DROPS AURICULAR (OTIC) EVERY 8 HOURS PRN
Qty: 1 BOTTLE | Refills: 1 | Status: SHIPPED | OUTPATIENT
Start: 2020-06-10 | End: 2020-06-20

## 2020-06-10 NOTE — TELEPHONE ENCOUNTER
Kay Huff called requesting a refill on the following medications:  Requested Prescriptions     Pending Prescriptions Disp Refills    neomycin-polymyxin-hydrocortisone (CORTISPORIN) 1 % SOLN otic solution 1 Bottle 1     Sig: Place 1 drop into both ears every 8 hours as needed (ears) Right ear     Pharmacy verified:  yes      Date of last visit: 1-  Date of next visit (if applicable): Visit date not found

## 2020-08-04 ENCOUNTER — PATIENT MESSAGE (OUTPATIENT)
Dept: FAMILY MEDICINE CLINIC | Age: 65
End: 2020-08-04

## 2020-08-04 NOTE — TELEPHONE ENCOUNTER
From: Roger Maria  To: Annette Hoover MD  Sent: 8/4/2020 3:09 PM EDT  Subject: Non-Urgent Medical Question    i have't felt good for a while now [3 months]. maybe due for blood tests. would like appintment any time.  maybe after blood test. ck. thyroid? farideh cevallos

## 2020-08-25 ENCOUNTER — PATIENT MESSAGE (OUTPATIENT)
Dept: FAMILY MEDICINE CLINIC | Age: 65
End: 2020-08-25

## 2020-08-25 NOTE — TELEPHONE ENCOUNTER
From: Emmanuelle Ding  To: Shahana Redmond MD  Sent: 8/25/2020 3:50 PM EDT  Subject: Non-Urgent Medical Question    need appointment. do need papers to get blood test done? can i get it tested at your office?      ----- Message -----   Duarte Nicholas MD   Sent:8/4/2020 8:48 PM EDT   To:Farideh Wong   Subject:RE: Non-Urgent Medical Question    I have ordered several blood tests for you to get done prior to an appointment with us. Please call to schedule an appointment or schedule through [employer]. Thank you. Shahana Redmond MD      ----- Message -----   From:Farideh Lucero   Sent:8/4/2020 3:09 PM EDT   To:Aaron Caldwell MD   Subject:Non-Urgent Medical Question    i have't felt good for a while now [3 months]. maybe due for blood tests. would like appintment any time.  maybe after blood test. ck. thyroid? farideh wong

## 2020-08-26 ENCOUNTER — HOSPITAL ENCOUNTER (OUTPATIENT)
Age: 65
Discharge: HOME | End: 2020-08-26
Payer: MEDICARE

## 2020-08-26 DIAGNOSIS — E03.4 HYPOTHYROIDISM DUE TO ACQUIRED ATROPHY OF THYROID: ICD-10-CM

## 2020-08-26 DIAGNOSIS — R73.09 ELEVATED GLUCOSE: ICD-10-CM

## 2020-08-26 DIAGNOSIS — Z13.220 SCREENING CHOLESTEROL LEVEL: ICD-10-CM

## 2020-08-26 LAB
ALBUMIN SERPL-MCNC: 3.9 G/DL (ref 3.5–5.1)
ALP BLD-CCNC: 80 U/L (ref 38–126)
ALT SERPL-CCNC: 17 U/L (ref 11–66)
ANION GAP SERPL CALCULATED.3IONS-SCNC: 11 MEQ/L (ref 8–16)
AST SERPL-CCNC: 17 U/L (ref 5–40)
AVERAGE GLUCOSE: 165 MG/DL (ref 70–126)
BILIRUB SERPL-MCNC: 0.6 MG/DL (ref 0.3–1.2)
BUN BLDV-MCNC: 12 MG/DL (ref 7–22)
CALCIUM SERPL-MCNC: 9.8 MG/DL (ref 8.5–10.5)
CHLORIDE BLD-SCNC: 106 MEQ/L (ref 98–111)
CHOLESTEROL, TOTAL: 156 MG/DL (ref 100–199)
CO2: 24 MEQ/L (ref 23–33)
CREAT SERPL-MCNC: 0.8 MG/DL (ref 0.4–1.2)
ERYTHROCYTE [DISTWIDTH] IN BLOOD BY AUTOMATED COUNT: 12.7 % (ref 11.5–14.5)
ERYTHROCYTE [DISTWIDTH] IN BLOOD BY AUTOMATED COUNT: 41.7 FL (ref 35–45)
GFR SERPL CREATININE-BSD FRML MDRD: 72 ML/MIN/1.73M2
GLUCOSE BLD-MCNC: 152 MG/DL (ref 70–108)
HBA1C MFR BLD: 7.5 % (ref 4.4–6.4)
HCT VFR BLD CALC: 45.8 % (ref 37–47)
HDLC SERPL-MCNC: 40 MG/DL
HEMOGLOBIN: 15.1 GM/DL (ref 12–16)
LDL CHOLESTEROL CALCULATED: 93 MG/DL
MCH RBC QN AUTO: 30.1 PG (ref 26–33)
MCHC RBC AUTO-ENTMCNC: 33 GM/DL (ref 32.2–35.5)
MCV RBC AUTO: 91.4 FL (ref 81–99)
PLATELET # BLD: 302 THOU/MM3 (ref 130–400)
PMV BLD AUTO: 10.5 FL (ref 9.4–12.4)
POTASSIUM SERPL-SCNC: 4.1 MEQ/L (ref 3.5–5.2)
RBC # BLD: 5.01 MILL/MM3 (ref 4.2–5.4)
SODIUM BLD-SCNC: 141 MEQ/L (ref 135–145)
T4 FREE: 0.86 NG/DL (ref 0.93–1.76)
TOTAL PROTEIN: 6.9 G/DL (ref 6.1–8)
TRIGL SERPL-MCNC: 113 MG/DL (ref 0–199)
TSH SERPL DL<=0.05 MIU/L-ACNC: 0.39 UIU/ML (ref 0.4–4.2)
WBC # BLD: 7.1 THOU/MM3 (ref 4.8–10.8)

## 2020-08-26 PROCEDURE — 83036 HEMOGLOBIN GLYCOSYLATED A1C: CPT

## 2020-08-26 PROCEDURE — 80061 LIPID PANEL: CPT

## 2020-08-26 PROCEDURE — 84443 ASSAY THYROID STIM HORMONE: CPT

## 2020-08-26 PROCEDURE — 80053 COMPREHEN METABOLIC PANEL: CPT

## 2020-08-26 PROCEDURE — 85027 COMPLETE CBC AUTOMATED: CPT

## 2020-08-26 PROCEDURE — 36415 COLL VENOUS BLD VENIPUNCTURE: CPT

## 2020-08-26 PROCEDURE — 84439 ASSAY OF FREE THYROXINE: CPT

## 2020-08-31 ENCOUNTER — OFFICE VISIT (OUTPATIENT)
Dept: FAMILY MEDICINE CLINIC | Age: 65
End: 2020-08-31
Payer: COMMERCIAL

## 2020-08-31 VITALS
OXYGEN SATURATION: 97 % | BODY MASS INDEX: 43.26 KG/M2 | HEART RATE: 75 BPM | SYSTOLIC BLOOD PRESSURE: 136 MMHG | DIASTOLIC BLOOD PRESSURE: 80 MMHG | WEIGHT: 253.4 LBS | HEIGHT: 64 IN | TEMPERATURE: 97.1 F

## 2020-08-31 PROBLEM — E66.01 MORBIDLY OBESE (HCC): Status: ACTIVE | Noted: 2020-08-31

## 2020-08-31 PROCEDURE — 99214 OFFICE O/P EST MOD 30 MIN: CPT | Performed by: FAMILY MEDICINE

## 2020-08-31 PROCEDURE — 3051F HG A1C>EQUAL 7.0%<8.0%: CPT | Performed by: FAMILY MEDICINE

## 2020-08-31 RX ORDER — BUSPIRONE HYDROCHLORIDE 5 MG/1
5 TABLET ORAL 2 TIMES DAILY
Qty: 60 TABLET | Refills: 0 | Status: SHIPPED | OUTPATIENT
Start: 2020-08-31 | End: 2020-09-30 | Stop reason: SDUPTHER

## 2020-08-31 ASSESSMENT — ENCOUNTER SYMPTOMS
WHEEZING: 0
SHORTNESS OF BREATH: 1

## 2020-08-31 NOTE — PROGRESS NOTES
[facility]  [address] [place] [address]  Dept: [phone]  Dept Fax: [phone]  Loc: [phone]  PROGRESS NOTE      Visit Date: 8/31/2020    Lilian Pagan is a 71 y.o. female who presents today for:  Chief Complaint   Patient presents with    Follow-up    Depression    Hypothyroidism       Subjective:  HPI    7-month follow-up    Type 2 diabetes: Recent A1c a few days ago was 7.5%. She is currently not on diabetes medications as she was previously diet and exercise controlled. Checks glucose once daily. Glucose 110s-300 with most glucose levels <200. Exercise:  Walks daily for 5 minutes. Knee pain limits exercise. Hypothyroidism: On thyroid  mg daily. TSH is slightly low with a slightly low free T4 on recent labs last week. Fingernails are not growing well. Unable to take synthroid as she had diarrhea 30 years ago with it. She feels lumps in her thyroid. Depression: On Paxil 40 mg daily. She reports being more depressed. Stress with . Review of Systems   Constitutional: Negative for chills and fever. Respiratory: Positive for shortness of breath. Negative for wheezing. Cardiovascular: Negative for chest pain and leg swelling. Neurological: Negative for dizziness and syncope. Psychiatric/Behavioral: Positive for sleep disturbance. The patient is not nervous/anxious.       Patient Active Problem List   Diagnosis    Lactose intolerance    Depression    Gastroesophageal reflux disease    Hypothyroidism    Irritable bowel syndrome    Type 2 diabetes mellitus with hyperglycemia, without long-term current use of insulin (HCC)    Murmur---3/6 harsh A2---Mild AS    H/O malignant neoplasm of endometrium    Morbidly obese (HCC)     Past Medical History:   Diagnosis Date    Depression     Endometrial cancer ([facility] [address]     Hypothyroidism     Iron deficiency anemia 02/2002 to 12/2002   [facility] Irritable bowel syndrome     Lactose intolerance 2000    Murmur     Neurodermatitis 07/2000    with exzema    Prediabetes       Past Surgical History:   Procedure Laterality Date    CYST REMOVAL  1977    left ovary     DENTAL SURGERY  unsure of date    [facility] DENTAL SURGERY  03/01/2017    dental extractions     ENDOSCOPY, COLON, DIAGNOSTIC  7/24/14    Dr. Linda Dumont  11/2010    Dr. Vesta Alicea    left    [address]  11/2010    Dr. Pari Monsalve     Family History   Problem Relation Age of Onset    Alzheimer's Disease Mother     Cancer Mother         breast    High Blood Pressure Mother     Cancer Father         lung    Cancer Sister         breast    ADHD Sister     Diabetes Maternal Grandmother     Heart Disease Sister         CABG    Stroke Neg Hx      Social History     Tobacco Use    Smoking status: Never Smoker    Smokeless tobacco: Never Used   Substance Use Topics    Alcohol use: No     Alcohol/week: 0.0 standard drinks      Current Outpatient Medications   Medication Sig Dispense Refill    thyroid (NP THYROID) 120 MG tablet Take 1 tablet by mouth daily 90 tablet 3    PARoxetine (PAXIL) 40 MG tablet Take 1 tablet by mouth every morning 90 tablet 3    diclofenac (VOLTAREN) 75 MG EC tablet Take 1 tablet by mouth 2 times daily 60 tablet 1    Blood Glucose Monitoring Suppl (ONE TOUCH ULTRA 2) w/Device KIT One Touch Ultra 2 glucometer. Tests once daily. DX:R73.09 1 kit 0    ONE TOUCH LANCETS MISC One Touch Lancets. Uses once daily. DX:R73.09 100 each 3    blood glucose test strips (ASCENSIA AUTODISC VI;ONE TOUCH ULTRA TEST VI) strip One Touch Ultra 2 testing strips. Use once daily. DX:R73.09 100 each 3     No current facility-administered medications for this visit.       Allergies   Allergen Reactions    Ceclor [Cefaclor] Diarrhea    Cleocin [Clindamycin Hcl] Diarrhea    Penicillins Nausea Only     Stomach pain like ulcer pain    Sulfa Antibiotics Diarrhea    Vibramycin [Doxycycline Calcium] Diarrhea     Health Maintenance   Topic Date Due    Diabetic foot exam  09/04/1964    Diabetic retinal exam  09/04/1964    HIV screen  09/04/1969    Diabetic microalbuminuria test  09/04/1972    DTaP/Tdap/Td vaccine (1 - Tdap) 09/04/1973    DEXA (modify frequency per FRAX score)  09/04/2009    Breast cancer screen  02/08/2019    Flu vaccine (1) 09/01/2020    Pneumococcal 65+ years Vaccine (2 of 2 - PPSV23) 09/16/2020    A1C test (Diabetic or Prediabetic)  09/20/2020    Lipid screen  09/20/2020    TSH testing  09/20/2020    Colon cancer screen colonoscopy  07/24/2024    Shingles Vaccine  Completed    Hepatitis C screen  Completed    Hepatitis A vaccine  Aged Out    Hib vaccine  Aged Out    Meningococcal (ACWY) vaccine  Aged Out       Objective:  /80 (Site: Left Upper Arm, Position: Sitting)   Pulse 75   Temp 97.1 °F (36.2 °C)   Ht 5' 4\" (1.626 m)   Wt 253 lb 6.4 oz (114.9 kg)   SpO2 97%   BMI 43.50 kg/m²   Physical Exam  Vitals signs reviewed. Constitutional:       General: She is not in acute distress. Appearance: She is well-developed. She is not ill-appearing. Neck:      Thyroid: Thyromegaly present. Comments: Palpable bilateral thyroid lumps  Cardiovascular:      Rate and Rhythm: Normal rate and regular rhythm. Heart sounds: Murmur present. Systolic murmur present with a grade of 2/6. Pulmonary:      Effort: Pulmonary effort is normal. No respiratory distress. Breath sounds: Normal breath sounds. No wheezing or rhonchi. Musculoskeletal:      Right lower leg: No edema. Left lower leg: No edema. Neurological:      Mental Status: She is alert and oriented to person, place, and time. Mental status is at baseline. Psychiatric:         Mood and Affect: Mood is depressed.          Behavior: Behavior normal.       Visual inspection:  Deformity/amputation: absent  Skin lesions/pre-ulcerative calluses: absent  Edema: right- negative, left- negative    Sensory exam:  Monofilament sensation: normal  (minimum of 5 random plantar locations tested, avoiding callused areas - > 1 area with absence of sensation is + for neuropathy)    Plus at least one of the following:  Pulses: normal,     Lab Results   Component Value Date    WBC 7.1 08/26/2020    HGB 15.1 08/26/2020    HCT 45.8 08/26/2020     08/26/2020    CHOL 156 08/26/2020    TRIG 113 08/26/2020    HDL 40 08/26/2020    ALT 17 08/26/2020    AST 17 08/26/2020     08/26/2020    K 4.1 08/26/2020     08/26/2020    CREATININE 0.8 08/26/2020    BUN 12 08/26/2020    CO2 24 08/26/2020    TSH 0.387 (L) 08/26/2020    GLUF 117 (H) 11/08/2016    LABA1C 7.5 (H) 08/26/2020         Impression/Plan:  1. Type 2 diabetes mellitus with hyperglycemia, without long-term current use of insulin (HCC)  Chronic. Uncontrolled. A1c is 7.5%. Start metformin. Continue with diet and exercise  -  DIABETES FOOT EXAM  - metFORMIN (GLUCOPHAGE) 500 MG tablet; Take 1 tablet by mouth 2 times daily (with meals)  Dispense: 180 tablet; Refill: 1    2. Major depressive disorder with single episode, [facility] ([facility]  Chronic. Not controlled. Continue Paxil. Start BuSpar  - busPIRone (BUSPAR) 5 MG tablet; Take 1 tablet by mouth 2 times daily  Dispense: 60 tablet; Refill: 0    3. Hypothyroidism due to acquired atrophy of thyroid  Chronic. Thyroid labs show low TSH with low free T4 which is a bit confusing with her being on thyroid NP. Recheck thyroid labs in 1 month and then decide on dose changes at that time  - US THYROID; Future  - TSH; Future  - T4, Free; Future    4. Morbidly obese (HCC)  Chronic. Recommend diet and exercise    5. Thyroid lump  New problem. Suspect palpable thyroid nodules. Will obtain ultrasound  - US THYROID; Future      They voiced understanding. All questions answered. They agreed with treatment plan.    See patient instructions for any educational materials that may have been given. Discussed use, benefit, and side effects of prescribed medications. Reviewed health maintenance. (Please note that portions of this note may have been completed with a voice recognition program.  Efforts were made to edit the dictation but occasionally words are mis-transcribed.)    Return in about 3 months (around 11/30/2020) for DM (POC a1c).       Electronically signed by Monique Mccabe MD on 8/31/2020 at 11:22 AM

## 2020-09-04 ENCOUNTER — HOSPITAL ENCOUNTER (OUTPATIENT)
Dept: ULTRASOUND IMAGING | Age: 65
Discharge: HOME | End: 2020-09-04
Payer: MEDICARE

## 2020-09-04 PROCEDURE — 76536 US EXAM OF HEAD AND NECK: CPT

## 2020-10-01 ENCOUNTER — HOSPITAL ENCOUNTER (OUTPATIENT)
Age: 65
Discharge: HOME | End: 2020-10-01
Payer: MEDICARE

## 2020-10-01 DIAGNOSIS — E03.4 HYPOTHYROIDISM DUE TO ACQUIRED ATROPHY OF THYROID: ICD-10-CM

## 2020-10-01 LAB
T4 FREE: 0.85 NG/DL (ref 0.93–1.76)
TSH SERPL DL<=0.05 MIU/L-ACNC: 0.32 UIU/ML (ref 0.4–4.2)

## 2020-10-01 PROCEDURE — 84439 ASSAY OF FREE THYROXINE: CPT

## 2020-10-01 PROCEDURE — 36415 COLL VENOUS BLD VENIPUNCTURE: CPT

## 2020-10-01 PROCEDURE — 84443 ASSAY THYROID STIM HORMONE: CPT

## 2020-10-02 ENCOUNTER — TELEPHONE (OUTPATIENT)
Dept: FAMILY MEDICINE CLINIC | Age: 65
End: 2020-10-02

## 2020-10-02 NOTE — TELEPHONE ENCOUNTER
Spoke with the patient regarding the NP thyroid medication    Patient is able to get this medication without any problems  Patient is agreeable to the increase in dosage

## 2020-10-02 NOTE — TELEPHONE ENCOUNTER
[place] Thyroid increased to 130 mg. Recheck TSH in 4 weeks. Please advise patient.   Clarice Pearce MD

## 2020-11-25 ENCOUNTER — PATIENT MESSAGE (OUTPATIENT)
Dept: FAMILY MEDICINE CLINIC | Age: 65
End: 2020-11-25

## 2020-11-25 NOTE — TELEPHONE ENCOUNTER
From: Manuela Sandoval  To: Shay Aly MD  Sent: 11/25/2020 8:01 AM EST  Subject: Prescription Question    i have NOT goten a prescripion on mail for 130m. thyroid. SO CANCEL APPT. for [id number]. i need higher dose of ''buspirone''. please. and scrip for 90 days. so i don't forget to get them. writen scrips are easyier for me to ck. $$$$. ALSO would like Leopoldo Barns to be my Doctor. if i can. thanks.

## 2020-12-02 ENCOUNTER — OFFICE VISIT (OUTPATIENT)
Dept: FAMILY MEDICINE CLINIC | Age: 65
End: 2020-12-02
Payer: MEDICARE

## 2020-12-02 VITALS
DIASTOLIC BLOOD PRESSURE: 60 MMHG | TEMPERATURE: 97.5 F | WEIGHT: 244 LBS | SYSTOLIC BLOOD PRESSURE: 126 MMHG | HEART RATE: 76 BPM | BODY MASS INDEX: 41.66 KG/M2 | HEIGHT: 64 IN

## 2020-12-02 PROBLEM — F41.1 GENERALIZED ANXIETY DISORDER: Status: ACTIVE | Noted: 2020-12-02

## 2020-12-02 PROCEDURE — 90732 PPSV23 VACC 2 YRS+ SUBQ/IM: CPT | Performed by: FAMILY MEDICINE

## 2020-12-02 PROCEDURE — G0009 ADMIN PNEUMOCOCCAL VACCINE: HCPCS | Performed by: FAMILY MEDICINE

## 2020-12-02 PROCEDURE — 3051F HG A1C>EQUAL 7.0%<8.0%: CPT | Performed by: FAMILY MEDICINE

## 2020-12-02 PROCEDURE — 99214 OFFICE O/P EST MOD 30 MIN: CPT | Performed by: FAMILY MEDICINE

## 2020-12-02 RX ORDER — LEVOTHYROXINE AND LIOTHYRONINE 76; 18 UG/1; UG/1
120 TABLET ORAL DAILY
Qty: 90 TABLET | Refills: 3 | Status: SHIPPED | OUTPATIENT
Start: 2020-12-02 | End: 2021-03-01

## 2020-12-02 RX ORDER — BUSPIRONE HYDROCHLORIDE 10 MG/1
10 TABLET ORAL 3 TIMES DAILY
Qty: 90 TABLET | Refills: 5 | Status: SHIPPED | OUTPATIENT
Start: 2020-12-02 | End: 2021-07-20 | Stop reason: SDUPTHER

## 2020-12-02 RX ORDER — PAROXETINE HYDROCHLORIDE 40 MG/1
40 TABLET, FILM COATED ORAL EVERY MORNING
Qty: 90 TABLET | Refills: 3 | Status: SHIPPED | OUTPATIENT
Start: 2020-12-02 | End: 2022-03-04 | Stop reason: SDUPTHER

## 2020-12-02 RX ORDER — BUSPIRONE HYDROCHLORIDE 5 MG/1
5 TABLET ORAL 2 TIMES DAILY
COMMUNITY
End: 2020-12-02 | Stop reason: SDUPTHER

## 2020-12-02 ASSESSMENT — ENCOUNTER SYMPTOMS: SHORTNESS OF BREATH: 0

## 2020-12-02 NOTE — PATIENT INSTRUCTIONS
To protect your eyes from damage due to diabetes, please schedule an appointment with your ophthalmologist. Please let us know if you need help doing this.

## 2020-12-02 NOTE — PROGRESS NOTES
[address] [place] [address] [address], [address], [place]  Phone:  [phone]  KOR:[phone]       Name: Bishnu Wheeler  : 1955    Chief Complaint   Patient presents with    Diabetes    Thyroid Problem       HPI:     Bishnu Wheeler is a 76 y.o. female who presents today for     HPI    Thyroid issues -- hair eyebrow gone, changes to nails. Dr. Miko Muro was helping her with the levels. TSH and free T4 were low. armour 130 (NP) not availabe at [facility]. Has been taking consistently  daily. -- could not find 120 mg with a 1/4 pill a few times a week. This 1/4 tablet is not consistent. Frequently hot. No signs of mineral imbalance, obese but not cushinoid. Post menopausal.  Lost weight with less sweets in the past.    [employer] on paper. Depression/anxiety -- controlled with medications but Lives in very stressful situation.  may be depressed. Did well many years with cancer; now with penitentiary. Seems irritable and angry. Patient feels put down by what he says.  for 47 years. No physical abuse. Would like buspar dose to increase. DM -- believes mostly controlled. BS not checked regularly. Tries to eat vegetables daily. Not a lot of physical activity but keeps up with house work. Does not want to do mammogram at this time. Current Outpatient Medications:     thyroid (ARMOUR) 120 MG tablet, Take 1 tablet by mouth daily, Disp: 90 tablet, Rfl: 3    PARoxetine (PAXIL) 40 MG tablet, Take 1 tablet by mouth every morning, Disp: 90 tablet, Rfl: 3    busPIRone (BUSPAR) 10 MG tablet, Take 1 tablet by mouth 3 times daily, Disp: 90 tablet, Rfl: 5    metFORMIN (GLUCOPHAGE) 500 MG tablet, Take 1 tablet by mouth 2 times daily (with meals), Disp: 180 tablet, Rfl: 1    Blood Glucose Monitoring Suppl (ONE TOUCH ULTRA 2) w/Device KIT, One Touch Ultra 2 glucometer. Tests once daily. DX:R73.09, Disp: 1 kit, Rfl: 0    ONE TOUCH LANCETS MISC, One Touch Lancets. Uses once daily. DX:R73.09, Disp: 100 each, Rfl: 3    blood glucose test strips (ASCENSIA AUTODISC VI;ONE TOUCH ULTRA TEST VI) strip, One Touch Ultra 2 testing strips. Use once daily. DX:R73.09, Disp: 100 each, Rfl: 3    Allergies   Allergen Reactions    Ceclor [Cefaclor] Diarrhea    Cleocin [Clindamycin Hcl] Diarrhea    Penicillins Nausea Only     Stomach pain like ulcer pain    Sulfa Antibiotics Diarrhea    Vibramycin [Doxycycline Calcium] Diarrhea       Review of Systems   Constitutional: Negative for fatigue and fever. Respiratory: Negative for shortness of breath. Cardiovascular: Negative for chest pain and leg swelling.   hair loss. No rash. No nausea/vomiting. Objective:     /60 (Site: Right Upper Arm, Position: Sitting, Cuff Size: Large Adult)   Pulse 76   Temp 97.5 °F (36.4 °C) (Temporal)   Ht 5' 4\" (1.626 m)   Wt 244 lb (110.7 kg)   BMI 41.88 kg/m²   Wt Readings from Last 3 Encounters:   12/02/20 244 lb (110.7 kg)   08/31/20 253 lb 6.4 oz (114.9 kg)   01/27/20 257 lb 9.6 oz (116.8 kg)       Physical Exam  Constitutional:       General: She is not in acute distress. Appearance: Normal appearance. She is not ill-appearing. Cardiovascular:      Rate and Rhythm: Normal rate and regular rhythm. Heart sounds: No murmur. Pulmonary:      Effort: Pulmonary effort is normal. No respiratory distress. Breath sounds: Normal breath sounds. No wheezing. Musculoskeletal:         General: No swelling. Neurological:      Mental Status: She is alert.    Psychiatric:         Mood and Affect: Mood normal.       Diabetes labs reviewed:  Lab Results   Component Value Date    LABA1C 7.5 (H) 08/26/2020     No results found for: EAG  Lab Results   Component Value Date    LABA1C 7.5 (H) 08/26/2020    LABA1C 6.7 (H) 09/20/2019    LABA1C 5.3 04/05/2017       Lab Results   Component Value Date     08/26/2020    K 4.1 08/26/2020     08/26/2020    CO2 24 08/26/2020    BUN 12 08/26/2020    CREATININE 0.8 08/26/2020    CALCIUM 9.8 08/26/2020    GLUCOSE 152 (H) 08/26/2020        Lab Results   Component Value Date    CHOL 156 08/26/2020    CHOL 165 09/20/2019    CHOL 197 11/08/2016     Lab Results   Component Value Date    TRIG 113 08/26/2020    TRIG 108 09/20/2019    TRIG 102 11/08/2016     Lab Results   Component Value Date    HDL 40 08/26/2020    HDL 41 09/20/2019    HDL 46 11/12/2015     Lab Results   Component Value Date    LDLCHOLESTEROL 134 (H) 11/08/2016    LDLCALC 93 08/26/2020    LDLCALC 102 09/20/2019    LDLCALC 122 11/12/2015     No results found for: LABVLDL, VLDL  No results found for: CHOLHDLRATIO    No results found for: LABMICR, [id number]    Lab Results   Component Value Date    TSH 0.321 (L) 10/01/2020      No results found for: Venancio Pippins   No results found for: MG   Lab Results   Component Value Date    ALT 17 08/26/2020    AST 17 08/26/2020    ALKPHOS 80 08/26/2020    BILITOT 0.6 08/26/2020      Low free T4 noted      Assessment/Plan:     Laney Sorenson was seen today for diabetes and thyroid problem. Diagnoses and all orders for this visit:    Type 2 diabetes mellitus with hyperglycemia, without long-term current use of insulin (HCC)  -     Microalbumin / Creatinine Urine Ratio; Future  -     Basic Metabolic Panel; Future  -     Hemoglobin A1C; Future    Acquired hypothyroidism  -     thyroid (ARMOUR) 120 MG tablet; Take 1 tablet by mouth daily  -     TSH with Reflex; Future  -     T3; Future  -     T3, Free; Future  -     T4; Future  -     Microalbumin / Creatinine Urine Ratio; Future  -     Basic Metabolic Panel; Future    Major depressive disorder with single episode, in full remission ([facility] [address]  -     PARoxetine (PAXIL) 40 MG tablet; Take 1 tablet by mouth every morning  -     Microalbumin / Creatinine Urine Ratio; Future  -     Basic Metabolic Panel; Future    Generalized anxiety disorder  -     Microalbumin / Creatinine Urine Ratio;  Future  -     Basic Metabolic Panel; Future  -     busPIRone (BUSPAR) 10 MG tablet; Take 1 tablet by mouth 3 times daily    update lab work  Consider further endocrinological work up if TSH and free T4 are both still low  She denies physical abuse, but is clearly affected by chosen words by . Counseling given here. Ongoing counseling may be helpful. Eye exam recommended. Return in about 3 months (around 3/2/2021). Future Appointments   Date Time Provider Nishi Lombardo   3/1/2021  2:20 Layne Anderson MD [facility]          This office note has been dictated. Effort was made to review for errors but some may have been missed. Please contact Parminder Lipoma of note for clarification if needed.        Electronically signed by Bruce Sever, MD on 12/2/2020 at 2:37 PM

## 2020-12-07 ENCOUNTER — HOSPITAL ENCOUNTER (OUTPATIENT)
Age: 65
Discharge: HOME | End: 2020-12-07
Payer: MEDICARE

## 2020-12-07 DIAGNOSIS — F32.5 MAJOR DEPRESSIVE DISORDER WITH SINGLE EPISODE, IN FULL REMISSION (HCC): ICD-10-CM

## 2020-12-07 DIAGNOSIS — F41.1 GENERALIZED ANXIETY DISORDER: ICD-10-CM

## 2020-12-07 DIAGNOSIS — E11.65 TYPE 2 DIABETES MELLITUS WITH HYPERGLYCEMIA, WITHOUT LONG-TERM CURRENT USE OF INSULIN (HCC): ICD-10-CM

## 2020-12-07 DIAGNOSIS — E03.9 ACQUIRED HYPOTHYROIDISM: ICD-10-CM

## 2020-12-07 LAB
ANION GAP SERPL CALCULATED.3IONS-SCNC: 17 MEQ/L (ref 8–16)
AVERAGE GLUCOSE: 135 MG/DL (ref 70–126)
BUN BLDV-MCNC: 10 MG/DL (ref 7–22)
CALCIUM SERPL-MCNC: 9.6 MG/DL (ref 8.5–10.5)
CHLORIDE BLD-SCNC: 102 MEQ/L (ref 98–111)
CO2: 23 MEQ/L (ref 23–33)
CREAT SERPL-MCNC: 0.8 MG/DL (ref 0.4–1.2)
CREATININE, URINE: 145.9 MG/DL
GFR SERPL CREATININE-BSD FRML MDRD: 72 ML/MIN/1.73M2
GLUCOSE BLD-MCNC: 122 MG/DL (ref 70–108)
HBA1C MFR BLD: 6.5 % (ref 4.4–6.4)
MICROALBUMIN UR-MCNC: < 1.2 MG/DL
MICROALBUMIN/CREAT UR-RTO: 8 MG/G (ref 0–30)
POTASSIUM SERPL-SCNC: 4.2 MEQ/L (ref 3.5–5.2)
SODIUM BLD-SCNC: 142 MEQ/L (ref 135–145)
T3 TOTAL: 320 NG/DL (ref 72–181)
T4 FREE: 0.92 NG/DL (ref 0.93–1.76)
TSH SERPL DL<=0.05 MIU/L-ACNC: 0.14 UIU/ML (ref 0.4–4.2)

## 2020-12-07 PROCEDURE — 84481 FREE ASSAY (FT-3): CPT

## 2020-12-07 PROCEDURE — 82043 UR ALBUMIN QUANTITATIVE: CPT

## 2020-12-07 PROCEDURE — 84480 ASSAY TRIIODOTHYRONINE (T3): CPT

## 2020-12-07 PROCEDURE — 84439 ASSAY OF FREE THYROXINE: CPT

## 2020-12-07 PROCEDURE — 83036 HEMOGLOBIN GLYCOSYLATED A1C: CPT

## 2020-12-07 PROCEDURE — 80048 BASIC METABOLIC PNL TOTAL CA: CPT

## 2020-12-07 PROCEDURE — 84436 ASSAY OF TOTAL THYROXINE: CPT

## 2020-12-07 PROCEDURE — 84443 ASSAY THYROID STIM HORMONE: CPT

## 2020-12-07 PROCEDURE — 36415 COLL VENOUS BLD VENIPUNCTURE: CPT

## 2020-12-09 LAB
T3 FREE: 7.24 PG/ML (ref 2.02–4.43)
THYROXINE (T4): 7.6 UG/DL (ref 4.5–10.9)

## 2020-12-15 ENCOUNTER — TELEPHONE (OUTPATIENT)
Dept: PHARMACY | Facility: CLINIC | Age: 65
End: 2020-12-15

## 2020-12-15 NOTE — TELEPHONE ENCOUNTER
CLINICAL PHARMACY: ADHERENCE REVIEW  Identified care gap per [employer]; fills at [facility] DISCOUNT DRUGS: Diabetes adherence    Last Office Visit: 12/02/2020 Glenn Tran MD  NEXT: 3/1/21    Patient also appears to be taking: no SUPD, no ACE/ARB    DIABETES ADHERENCE  PDC: one fill  Needs 9 days to be adherent. Potential fail date:12/22/20  LLUVIA VIVAR,ERLINDA GROSS    Per Insurance Records   METFORMIN TAB 500MG last filled on 8/31/20 for a 90 day supply, BID;   Due 11/29/20    Per Myriam Ricardo at [facility]:  Has not been refilled since August.  She will process last refill for 90DS and will bill [employer]. Lab Results   Component Value Date    LABA1C 6.5 (H) 12/07/2020    LABA1C 7.5 (H) 08/26/2020    LABA1C 6.7 (H) 09/20/2019       STATIN ADHERENCE    NONE    Lab Results   Component Value Date    CHOL 156 08/26/2020    TRIG 113 08/26/2020    HDL 40 08/26/2020    LDLCHOLESTEROL 134 (H) 11/08/2016    LDLCALC 93 08/26/2020     ALT   Date Value Ref Range Status   08/26/2020 17 11 - 66 U/L Final     Comment:     Performed at [facility], [address]     AST   Date Value Ref Range Status   08/26/2020 17 5 - 40 U/L Final     The 10-year ASCVD risk score (Anjana Nobles, et al., 2013) is: 11.3%    Values used to calculate the score:      Age: 76 years      Sex: Female      Is Non- : No      Diabetic: Yes      Tobacco smoker: No      Systolic Blood Pressure: 277 mmHg      Is BP treated: No      HDL Cholesterol: 40 mg/dL      Total Cholesterol: 156 mg/dL     PLAN  Attempting to reach patient to review.  Left message asking for return call. and to  refill.

## 2020-12-21 NOTE — TELEPHONE ENCOUNTER
CLINICAL PHARMACY CONSULT: MED RECONCILIATION/REVIEW ADDENDUM    For Pharmacy Admin Tracking Only    PHSO: Yes  Total # of Interventions Recommended: 2  - New Order #: 0 New Medication Order Reason(s): Adherence  - Maintenance Safety Lab Monitoring #: 1  - New Therapy Lab Monitoring #: 0  Recommended intervention potential cost savings: 1  Total Interventions Accepted: 0  Time Spent (min): 15    Prisca Du CPhT.   [address]

## 2021-01-26 ENCOUNTER — PATIENT MESSAGE (OUTPATIENT)
Dept: FAMILY MEDICINE CLINIC | Age: 66
End: 2021-01-26

## 2021-01-26 DIAGNOSIS — R73.03 PREDIABETES: Chronic | ICD-10-CM

## 2021-01-26 DIAGNOSIS — R73.09 ELEVATED GLUCOSE: ICD-10-CM

## 2021-01-26 NOTE — TELEPHONE ENCOUNTER
Yon Barrier called requesting a refill on the following medications:  Requested Prescriptions     Pending Prescriptions Disp Refills    blood glucose test strips (ASCENSIA AUTODISC VI;ONE TOUCH ULTRA TEST VI) strip 100 each 3     Sig: One Touch Ultra 2 testing strips. Use once daily.  DX:R73.09       Date of last visit: 12/2/2020  Date of next visit (if applicable):3/1/2021  Date of last refill:   Pharmacy Name:       [facility]roeder, [address] [place] [address]

## 2021-01-26 NOTE — TELEPHONE ENCOUNTER
From: Arturo Cabezas  To: Mono Marcial MD  Sent: 2021 11:29 AM EST  Subject: Prescription Question    hello, i am doing well with the new thyroid rx. I NEED [[[ onetouch ultra blue test strips,]]]]]] from [address] [place] [address]. Also my , Samara Grossman,  1955. needs the same items. Kimberly Reyes my phone #[phone].

## 2021-01-27 NOTE — TELEPHONE ENCOUNTER
Sent to ARIELLA Celis. Initially sent to [facility] as refill set up with that pharmacy. Please cancel strip refill sent to [facility].

## 2021-02-01 ENCOUNTER — HOSPITAL ENCOUNTER (EMERGENCY)
Age: 66
Discharge: HOME | End: 2021-02-01
Payer: MEDICARE

## 2021-02-01 ENCOUNTER — APPOINTMENT (OUTPATIENT)
Dept: GENERAL RADIOLOGY | Age: 66
End: 2021-02-01
Payer: MEDICARE

## 2021-02-01 VITALS
HEART RATE: 78 BPM | DIASTOLIC BLOOD PRESSURE: 77 MMHG | OXYGEN SATURATION: 98 % | SYSTOLIC BLOOD PRESSURE: 145 MMHG | RESPIRATION RATE: 16 BRPM | TEMPERATURE: 98.3 F
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
## 2021-02-01 DIAGNOSIS — S70.02XA CONTUSION OF LEFT HIP, INITIAL ENCOUNTER: Primary | ICD-10-CM

## 2021-02-01 DIAGNOSIS — S20.222A CONTUSION OF LEFT SIDE OF BACK, INITIAL ENCOUNTER: ICD-10-CM

## 2021-02-01 PROCEDURE — 73502 X-RAY EXAM HIP UNI 2-3 VIEWS: CPT

## 2021-02-01 PROCEDURE — 99213 OFFICE O/P EST LOW 20 MIN: CPT | Performed by: NURSE PRACTITIONER

## 2021-02-01 PROCEDURE — 99213 OFFICE O/P EST LOW 20 MIN: CPT

## 2021-02-01 ASSESSMENT — ENCOUNTER SYMPTOMS
NAUSEA: 0
COLOR CHANGE: 1
VOMITING: 0

## 2021-02-01 ASSESSMENT — PAIN DESCRIPTION - LOCATION: LOCATION: HIP

## 2021-02-01 NOTE — ED TRIAGE NOTES
Royal vitale arrives to room with complaint of fall down 2 steps symptoms started 3 days ago. Royal vitale states her knee gave out and she fell down 2 steps injuring her left hip.

## 2021-02-01 NOTE — ED PROVIDER NOTES
[address]  Urgent Care Encounter       CHIEF COMPLAINT       Chief Complaint   Patient presents with    Fall    Hip Pain       Nurses Notes reviewed and I agree except as noted in the HPI. HISTORY OF PRESENT ILLNESS   Arturo Cabezas is a 76 y.o. female who presents with complaints of left hip pain. Patient fell while walking down 2 wooden steps on Friday. She states her knees gave out and she fell backwards with the majority of the impact to her left hip and back. She reports bruising to the left flank area. She reports increased pain when sitting and standing. After she gets either up or down the pain improves. She also states she needs to keep her knees together when she walks to lessen the pain. No back or sacral pain. Pain currently 3/10. She has been taking Norco for the pain. The history is provided by the patient. REVIEW OF SYSTEMS     Review of Systems   Constitutional: Negative for fever. Gastrointestinal: Negative for nausea and vomiting. Musculoskeletal:        Left hip pain   Skin: Positive for color change. Negative for wound. Neurological: Positive for weakness. Negative for numbness. PAST MEDICAL HISTORY         Diagnosis Date    Depression     [facility]ometrial cancer ([facility] [address]     Hypothyroidism     Iron deficiency anemia 02/2002 to 12/2002    Irritable bowel syndrome     Lactose intolerance 2000    Murmur     Neurodermatitis 07/2000    with exzema    Prediabetes        SURGICALHISTORY     Patient  has a past surgical history that includes Hysterectomy (11/2010); Small intestine surgery (11/2010); cyst removal (1977); Ovary removal (1977); Tubal ligation (1985); Dental surgery (unsure of date ); Endoscopy, colon, diagnostic (7/24/14); and Dental surgery (03/01/2017).     CURRENT MEDICATIONS       Discharge Medication List as of 2/1/2021  8:44 AM      CONTINUE these medications which have NOT CHANGED    Details   blood glucose test strips (ASCENSIA AUTODISC VI;ONE TOUCH ULTRA TEST VI) strip Disp-100 each, R-3, NormalOne Touch Ultra 2 testing strips. Use once daily. DX:R73.09       levothyroxine (SYNTHROID) 175 MCG tablet Take 1 tablet by mouth daily, Disp-30 tablet, R-5Normal      thyroid (ARMOUR) 120 MG tablet Take 1 tablet by mouth daily, Disp-90 tablet,R-3Print      PARoxetine (PAXIL) 40 MG tablet Take 1 tablet by mouth every morning, Disp-90 tablet,R-3Print      busPIRone (BUSPAR) 10 MG tablet Take 1 tablet by mouth 3 times daily, Disp-90 tablet,R-5Print      metFORMIN (GLUCOPHAGE) 500 MG tablet Take 1 tablet by mouth 2 times daily (with meals), Disp-180 tablet,R-1Print      Blood Glucose Monitoring Suppl (ONE TOUCH ULTRA 2) w/Device KIT Disp-1 kit, R-0, NormalOne Touch Ultra 2 glucometer. Tests once daily. DX:R73.09      ONE TOUCH LANCETS MISC Disp-100 each, R-3, NormalOne Touch Lancets. Uses once daily. DX:R73.09             ALLERGIES     Patient is is allergic to ceclor [cefaclor]; cleocin [clindamycin hcl]; penicillins; sulfa antibiotics; and vibramycin [doxycycline calcium]. Patients   Immunization History   Administered Date(s) Administered    Influenza Vaccine, unspecified formulation 10/15/2018    Influenza Virus Vaccine 09/25/2020    Influenza, Triv, inactivated, subunit, adjuvanted, IM (Fluad 65 yrs and older) 10/07/2019    Pneumococcal Conjugate 13-valent (Pamila Nares) 09/16/2019    Pneumococcal Polysaccharide ([id number]) 12/02/2020    Zoster Recombinant (Shingrix) 01/22/2019, 03/18/2019       FAMILY HISTORY     Patient's family history includes ADHD in her sister; Alzheimer's Disease in her mother; Cancer in her father, mother, and sister; Diabetes in her maternal grandmother; Heart Disease in her sister; High Blood Pressure in her mother. SOCIAL HISTORY     Patient  reports that she has never smoked. She has never used smokeless tobacco. She reports that she does not drink alcohol or use drugs.     PHYSICAL EXAM     ED TRIAGE VITALS BP: (!) 145/77, Temp: 98.3 °F (36.8 °C), Pulse: 78, Resp: 16, SpO2: 98 %,Estimated body mass index is 41.88 kg/m² as calculated from the following:    Height as of 12/2/20: 5' 4\" (1.626 m). Weight as of 12/2/20: 244 lb (110.7 kg). ,No LMP recorded. Patient has had a hysterectomy. Physical Exam  Vitals signs and nursing note reviewed. Constitutional:       General: She is not in acute distress. Appearance: She is well-developed. HENT:      Head: Normocephalic and atraumatic. Pulmonary:      Effort: Pulmonary effort is normal. No respiratory distress. Musculoskeletal:      Left hip: She exhibits decreased range of motion, decreased strength and tenderness (Posterior hip over the mid upper buttocks). Lumbar back: She exhibits no tenderness. Back:         Legs:    Skin:     General: Skin is warm and dry. Neurological:      General: No focal deficit present. Mental Status: She is alert and oriented to person, place, and time. Psychiatric:         Mood and Affect: Mood normal.         Speech: Speech normal.         Behavior: Behavior normal. Behavior is cooperative. DIAGNOSTIC RESULTS     Labs:No results found for this visit on 02/01/21. IMAGING:    XR HIP 2-3 VW W PELVIS LEFT   Final Result   No acute osseous abnormality            **This report has been created using voice recognition software. It may contain minor errors which are inherent in voice recognition technology. **      Final report electronically signed by Dr. Chely Louie on 2/1/2021 8:37 AM            EKG:      URGENT CARE COURSE:     Vitals:    02/01/21 0834   BP: (!) 145/77   Pulse: 78   Resp: 16   Temp: 98.3 °F (36.8 °C)   TempSrc: Temporal   SpO2: 98%       Medications - No data to display         PROCEDURES:  None    FINAL IMPRESSION      1. Contusion of left hip, initial encounter    2. Contusion of left side of back, initial encounter    3.  Fall on stairs, initial encounter          DISPOSITION/ PLAN

## 2021-02-03 ENCOUNTER — TELEPHONE (OUTPATIENT)
Dept: FAMILY MEDICINE CLINIC | Age: 66
End: 2021-02-03

## 2021-02-03 NOTE — TELEPHONE ENCOUNTER
Donelle Pallas calls she had fallen and hit her hip and back on a wooden step , went to Urgent Care , Xray's negative, She is icing, resting and taking 600mg Ibuprofen for inflammation , the pain is no better and she was told to call her PCP if still in pain after a few days. She would like something else for pain if possible.

## 2021-02-04 ENCOUNTER — OFFICE VISIT (OUTPATIENT)
Dept: FAMILY MEDICINE CLINIC | Age: 66
End: 2021-02-04
Payer: MEDICARE

## 2021-02-04 VITALS
SYSTOLIC BLOOD PRESSURE: 156 MMHG | HEART RATE: 76 BPM | TEMPERATURE: 97.9 F | BODY MASS INDEX: 42 KG/M2 | DIASTOLIC BLOOD PRESSURE: 84 MMHG | WEIGHT: 246 LBS | HEIGHT: 64 IN

## 2021-02-04 DIAGNOSIS — S70.02XD CONTUSION OF LEFT HIP, SUBSEQUENT ENCOUNTER: ICD-10-CM

## 2021-02-04 DIAGNOSIS — M54.50 LUMBAR BACK PAIN: ICD-10-CM

## 2021-02-04 DIAGNOSIS — M25.552 LEFT HIP PAIN: Primary | ICD-10-CM

## 2021-02-04 PROCEDURE — 99213 OFFICE O/P EST LOW 20 MIN: CPT | Performed by: FAMILY MEDICINE

## 2021-02-04 RX ORDER — IBUPROFEN 600 MG/1
600 TABLET ORAL 3 TIMES DAILY PRN
Qty: 60 TABLET | Refills: 0 | Status: SHIPPED | OUTPATIENT
Start: 2021-02-04

## 2021-02-04 RX ORDER — HYDROCODONE BITARTRATE AND ACETAMINOPHEN 5; 325 MG/1; MG/1
1 TABLET ORAL 3 TIMES DAILY
Qty: 20 TABLET | Refills: 0 | Status: SHIPPED | OUTPATIENT
Start: 2021-02-04 | End: 2021-02-11

## 2021-02-04 NOTE — PROGRESS NOTES
[address] [place] [address] [address], [address], [place]  Phone:  [phone]  IRU:[phone]       Name: Ronaldo Ortez  : 1955    Chief Complaint   Patient presents with    Back Pain     fell hit back on step       HPI:     Ronaldo Ortez is a 76 y.o. female who presents today for     HPI    Follow up from  visit due to fall and hurting her hip 6 days ago now.  mg helping some but not enough. Sleep is difficult. No spasms. Can bear weight including standing on each leg for several seconds. Had knee given out which led to fall but knee okay now.   rev'd xrays with her. Bruise on back and left hip painful. No numbness or tingling. Current Outpatient Medications:     HYDROcodone-acetaminophen (NORCO) 5-325 MG per tablet, Take 1 tablet by mouth 3 times daily for 7 days. Intended supply: 30 days, Disp: 20 tablet, Rfl: 0    ibuprofen (ADVIL;MOTRIN) 600 MG tablet, Take 1 tablet by mouth 3 times daily as needed for Pain, Disp: 60 tablet, Rfl: 0    blood glucose test strips (ASCENSIA AUTODISC VI;ONE TOUCH ULTRA TEST VI) strip, One Touch Ultra 2 testing strips. Use once daily. DX:R73.09, Disp: 100 each, Rfl: 3    levothyroxine (SYNTHROID) 175 MCG tablet, Take 1 tablet by mouth daily, Disp: 30 tablet, Rfl: 5    thyroid (ARMOUR) 120 MG tablet, Take 1 tablet by mouth daily, Disp: 90 tablet, Rfl: 3    PARoxetine (PAXIL) 40 MG tablet, Take 1 tablet by mouth every morning, Disp: 90 tablet, Rfl: 3    busPIRone (BUSPAR) 10 MG tablet, Take 1 tablet by mouth 3 times daily, Disp: 90 tablet, Rfl: 5    metFORMIN (GLUCOPHAGE) 500 MG tablet, Take 1 tablet by mouth 2 times daily (with meals), Disp: 180 tablet, Rfl: 1    Blood Glucose Monitoring Suppl (ONE TOUCH ULTRA 2) w/Device KIT, One Touch Ultra 2 glucometer. Tests once daily. DX:R73.09, Disp: 1 kit, Rfl: 0    ONE TOUCH LANCETS MISC, One Touch Lancets. Uses once daily.  DX:R73.09, Disp: 100 each, Rfl: 3    Allergies   Allergen Reactions    Ceclor [Cefaclor] Diarrhea    Cleocin [Clindamycin Hcl] Diarrhea    Penicillins Nausea Only     Stomach pain like ulcer pain    Sulfa Antibiotics Diarrhea    Vibramycin [Doxycycline Calcium] Diarrhea       Review of Systems  No fever/chills. Objective:     BP (!) 156/84   Pulse 76   Temp 97.9 °F (36.6 °C) (Temporal)   Ht 5' 4\" (1.626 m)   Wt 246 lb (111.6 kg)   BMI 42.23 kg/m²     Physical Exam  Gen: NAD, AAO x 3, coherent, pleasant  Back: left lower lumbar and upper part of buttock with large bruise, edges are yellow but still purplish appearance. Tender to touch, center is indurated but not erythematous, likely hematoma but can't easily find edges. Mild warm in center. Left hip movement limited due to pain. Able to stand on either leg for 4-5 seconds    Assessment/Plan:     Shyam Rojas was seen today for back pain. Diagnoses and all orders for this visit:    Left hip pain  -     HYDROcodone-acetaminophen (NORCO) 5-325 MG per tablet; Take 1 tablet by mouth 3 times daily for 7 days. Intended supply: 30 days  -     ibuprofen (ADVIL;MOTRIN) 600 MG tablet; Take 1 tablet by mouth 3 times daily as needed for Pain    Lumbar back pain  -     HYDROcodone-acetaminophen (NORCO) 5-325 MG per tablet; Take 1 tablet by mouth 3 times daily for 7 days. Intended supply: 30 days  -     ibuprofen (ADVIL;MOTRIN) 600 MG tablet; Take 1 tablet by mouth 3 times daily as needed for Pain    Contusion of left hip, subsequent encounter  -     HYDROcodone-acetaminophen (NORCO) 5-325 MG per tablet; Take 1 tablet by mouth 3 times daily for 7 days. Intended supply: 30 days  -     ibuprofen (ADVIL;MOTRIN) 600 MG tablet; Take 1 tablet by mouth 3 times daily as needed for Pain    will add a few norco's. Discussed caution with use and only acute treatment. Continue with ice to painful muscle areas. Gentle stretching discussed as she feels better. Return for 3/1/2021.     Future Appointments   Date Time Provider Nishi Lombardo   3/1/2021  2:20 PM Piedad Ng MD [facility] [address]          This office note has been dictated. Effort was made to review for errors but some may have been missed. Please contact Dino Miller of note for clarification if needed.        Electronically signed by Piedad Ng MD on 2/4/2021 at 12:23 PM

## 2021-02-24 ENCOUNTER — HOSPITAL ENCOUNTER (OUTPATIENT)
Age: 66
Discharge: HOME | End: 2021-02-24
Payer: MEDICARE

## 2021-02-24 DIAGNOSIS — E03.9 ACQUIRED HYPOTHYROIDISM: ICD-10-CM

## 2021-02-24 LAB
T3 TOTAL: 142 NG/DL (ref 72–181)
T4 FREE: 1.64 NG/DL (ref 0.93–1.76)
TSH SERPL DL<=0.05 MIU/L-ACNC: 0.04 UIU/ML (ref 0.4–4.2)

## 2021-02-24 PROCEDURE — 36415 COLL VENOUS BLD VENIPUNCTURE: CPT

## 2021-02-24 PROCEDURE — 84443 ASSAY THYROID STIM HORMONE: CPT

## 2021-02-24 PROCEDURE — 84439 ASSAY OF FREE THYROXINE: CPT

## 2021-02-24 PROCEDURE — 84480 ASSAY TRIIODOTHYRONINE (T3): CPT

## 2021-03-01 ENCOUNTER — OFFICE VISIT (OUTPATIENT)
Dept: FAMILY MEDICINE CLINIC | Age: 66
End: 2021-03-01
Payer: MEDICARE

## 2021-03-01 VITALS
HEIGHT: 64 IN | SYSTOLIC BLOOD PRESSURE: 148 MMHG | WEIGHT: 246 LBS | BODY MASS INDEX: 42 KG/M2 | DIASTOLIC BLOOD PRESSURE: 72 MMHG | HEART RATE: 84 BPM | TEMPERATURE: 97.9 F | OXYGEN SATURATION: 99 %

## 2021-03-01 DIAGNOSIS — E03.9 ACQUIRED HYPOTHYROIDISM: ICD-10-CM

## 2021-03-01 DIAGNOSIS — Z78.0 POST-MENOPAUSAL: ICD-10-CM

## 2021-03-01 DIAGNOSIS — Z12.31 ENCOUNTER FOR SCREENING MAMMOGRAM FOR BREAST CANCER: ICD-10-CM

## 2021-03-01 DIAGNOSIS — E66.01 MORBIDLY OBESE (HCC): ICD-10-CM

## 2021-03-01 DIAGNOSIS — Z13.820 OSTEOPOROSIS SCREENING: ICD-10-CM

## 2021-03-01 DIAGNOSIS — E11.65 TYPE 2 DIABETES MELLITUS WITH HYPERGLYCEMIA, WITHOUT LONG-TERM CURRENT USE OF INSULIN (HCC): ICD-10-CM

## 2021-03-01 DIAGNOSIS — Z00.00 ROUTINE GENERAL MEDICAL EXAMINATION AT A HEALTH CARE FACILITY: Primary | ICD-10-CM

## 2021-03-01 DIAGNOSIS — M25.562 CHRONIC PAIN OF LEFT KNEE: ICD-10-CM

## 2021-03-01 DIAGNOSIS — F32.5 MAJOR DEPRESSIVE DISORDER WITH SINGLE EPISODE, IN FULL REMISSION (HCC): ICD-10-CM

## 2021-03-01 DIAGNOSIS — G89.29 CHRONIC PAIN OF LEFT KNEE: ICD-10-CM

## 2021-03-01 PROCEDURE — G0438 PPPS, INITIAL VISIT: HCPCS | Performed by: FAMILY MEDICINE

## 2021-03-01 RX ORDER — LEVOTHYROXINE SODIUM 175 UG/1
TABLET ORAL
Qty: 90 TABLET | Refills: 3 | Status: SHIPPED | OUTPATIENT
Start: 2021-03-01 | End: 2021-07-21

## 2021-03-01 ASSESSMENT — PATIENT HEALTH QUESTIONNAIRE - PHQ9
SUM OF ALL RESPONSES TO PHQ QUESTIONS 1-9: 0
2. FEELING DOWN, DEPRESSED OR HOPELESS: 0
1. LITTLE INTEREST OR PLEASURE IN DOING THINGS: 0
SUM OF ALL RESPONSES TO PHQ QUESTIONS 1-9: 0

## 2021-03-01 NOTE — PATIENT INSTRUCTIONS
Staff -- please obtain April 2020 visit from [address]     For knee strengthening/stretching,  -- do supported squats, aiming towards 20 a day  -- do supported hamstring/calf stretch with heel down, 3 times, 10 seconds  -- wall stretch, 10 seconds, 2-3 times    May try brazil nuts -- 3-4 twice a week    Okay for all blood work to be done in 6 months. Personalized Preventive Plan for Rubens Simmons - 3/1/2021  Medicare offers a range of preventive health benefits. Some of the tests and screenings are paid in full while other may be subject to a deductible, co-insurance, and/or copay. Some of these benefits include a comprehensive review of your medical history including lifestyle, illnesses that may run in your family, and various assessments and screenings as appropriate. After reviewing your medical record and screening and assessments performed today your provider may have ordered immunizations, labs, imaging, and/or referrals for you. A list of these orders (if applicable) as well as your Preventive Care list are included within your After Visit Summary for your review. Other Preventive Recommendations:    · A preventive eye exam performed by an eye specialist is recommended every 1-2 years to screen for glaucoma; cataracts, macular degeneration, and other eye disorders. · A preventive dental visit is recommended every 6 months. · Try to get at least 150 minutes of exercise per week or 10,000 steps per day on a pedometer . · Order or download the FREE \"Exercise & Physical Activity: Your Everyday Guide\" from [employer]. Call 2-416.534.6257 or search [employer] online. · You need 7409-6989 mg of calcium and 7770-6561 IU of vitamin D per day.  It is possible to meet your calcium requirement with diet alone, but a vitamin D supplement is usually necessary to meet this goal.

## 2021-03-12 ENCOUNTER — HOSPITAL ENCOUNTER (OUTPATIENT)
Dept: WOMENS IMAGING | Age: 66
Discharge: HOME | End: 2021-03-12
Payer: MEDICARE

## 2021-03-12 DIAGNOSIS — Z78.0 POST-MENOPAUSAL: ICD-10-CM

## 2021-03-12 DIAGNOSIS — Z12.31 ENCOUNTER FOR SCREENING MAMMOGRAM FOR BREAST CANCER: ICD-10-CM

## 2021-03-12 DIAGNOSIS — Z13.820 OSTEOPOROSIS SCREENING: ICD-10-CM

## 2021-03-12 PROCEDURE — 77080 DXA BONE DENSITY AXIAL: CPT

## 2021-03-12 PROCEDURE — 77063 BREAST TOMOSYNTHESIS BI: CPT

## 2021-03-17 ENCOUNTER — HOSPITAL ENCOUNTER (OUTPATIENT)
Dept: WOMENS IMAGING | Age: 66
Discharge: HOME | End: 2021-03-17
Payer: MEDICARE

## 2021-03-17 DIAGNOSIS — R92.0 MICROCALCIFICATION OF RIGHT BREAST ON MAMMOGRAM: ICD-10-CM

## 2021-03-17 PROCEDURE — G0279 TOMOSYNTHESIS, MAMMO: HCPCS

## 2021-03-19 ENCOUNTER — HOSPITAL ENCOUNTER (OUTPATIENT)
Dept: WOMENS IMAGING | Age: 66
Discharge: HOME | End: 2021-03-19
Payer: MEDICARE

## 2021-03-19 DIAGNOSIS — R92.1 BREAST CALCIFICATION, RIGHT: ICD-10-CM

## 2021-03-19 DIAGNOSIS — R92.1 BREAST CALCIFICATIONS: ICD-10-CM

## 2021-03-19 PROCEDURE — 88305 TISSUE EXAM BY PATHOLOGIST: CPT

## 2021-03-19 PROCEDURE — 2709999900 MAM STEREO BREAST BX W LOC DEVICE 1ST LESION RIGHT

## 2021-03-19 PROCEDURE — 77065 DX MAMMO INCL CAD UNI: CPT

## 2021-03-19 NOTE — PROGRESS NOTES
Formulation and discussion of sedation / procedure plans, risks, benefits, side effects and alternatives with patient and/or responsible adult completed.     Electronically signed by Rere Segovia MD on 3/19/2021 at 8:05 AM

## 2021-03-19 NOTE — LETTER
November 14, 2021     Kaitlyn Heard MD  [facility]        RE: Isidra Lawrence   MRN: [id number]  YOB: 1955   Breast Imaging Exam Done On: 3/19/21      Dear Provider,    Thank you for allowing us to care for your patient. Shola Mueller had a breast imaging exam with us on 3/19/21. At that time a Diagnostic Mammogram in 6 Months was recommended. The recommendations were due on 9/15/2021. A letter was previously sent to [facility] her of the need for the exam.  As of this date, we do not have record of this exam being performed. We would appreciate your assistance in contacting the patient and scheduling the appointment.     Sincerely,    Zen Apple MD   Interpreting Radiologist

## 2021-03-19 NOTE — PROGRESS NOTES
Breast Biopsy Flowsheet/Post-Operative Care    Date of Procedure: 3/19/2021  Physician: Dr. Vianney Schulte  Technologist: Mini Franks RT(R)(M)    Biopsy:stereotactic breast biopsy  Lesion type: Non-palpable  Breast: right    Clock face position: Site #1: central         Primary Method of Detection: Mammogram      Microcalcifications   Distribution: Grouped        Biopsy Method:   Mammotome:    Site # 1    Gauge: 10    # of Passes: 6     Clip: Emmyell        Pre-Op Assessment: (BI-RADS)   4. Suspicious Abnormality    Patient Tolerated Procedure: good  Complications: n/a  Comments: n/a    Post Operative Care  Steri strips: Yes  Dressing: Gauze, Tape   Ice Applied to Site:  No  Evidence of Bleeding:  No    Pain Verbalized: No      Written Discharge Instructions: Yes  Condition at Discharge: good  Time of Discharge: 9801    Electronically signed by Buckley Mortimer on 3/19/2021 at 8:50 AM

## 2021-03-19 NOTE — PROGRESS NOTES
[facility] MEDICAL [facility]  Pre-Biopsy Assessment      Patient Education    Written information about procedure Yes  right   Procedural steps explained Yes Stereotactic Biopsy   Post-op potential: bruising, hematoma, pain Yes    Self-care: activity, care of dressing Yes    Patient verbalized understanding Yes    Consent signed and witnessed Yes      Hormone Therapy Status: n/a    Recent Medication: N/A Last Dose: n/a                                     Hormone Replacement Therapy: no    Previous Breast Biopsy: no    Previous Diagnosis Cancer: yes - endometrial cancer 2010    Hysterectomy:yes - 2010    Emotional Status: Calm    Language or Physical Barriers: n/a    Comments: n/a      Electronically signed by Tyler Rodriguez on 3/19/2021 at 8:48 AM

## 2021-03-22 ENCOUNTER — CLINICAL DOCUMENTATION (OUTPATIENT)
Dept: WOMENS IMAGING | Age: 66
End: 2021-03-22

## 2021-03-22 NOTE — PROGRESS NOTES
Contact Type :    Telephone    Notes :  After consulting with Dr. Katelin Floyd was contacted by telephone. She was informed of the negative biopsy results and the need to return for a 6 month follow up. She voiced understanding. Biopsy site: doing well, no issues that she can see.        Results faxed to: Dr. Ovidio Hanson

## 2021-04-07 ENCOUNTER — HOSPITAL ENCOUNTER (OUTPATIENT)
Dept: GENERAL RADIOLOGY | Age: 66
Discharge: HOME | End: 2021-04-07
Payer: MEDICARE

## 2021-04-07 ENCOUNTER — OFFICE VISIT (OUTPATIENT)
Dept: FAMILY MEDICINE CLINIC | Age: 66
End: 2021-04-07
Payer: MEDICARE

## 2021-04-07 ENCOUNTER — HOSPITAL ENCOUNTER (OUTPATIENT)
Age: 66
Discharge: HOME | End: 2021-04-07
Payer: MEDICARE

## 2021-04-07 VITALS
BODY MASS INDEX: 42.4 KG/M2 | SYSTOLIC BLOOD PRESSURE: 140 MMHG | WEIGHT: 247 LBS | DIASTOLIC BLOOD PRESSURE: 62 MMHG | TEMPERATURE: 98 F | HEART RATE: 76 BPM

## 2021-04-07 DIAGNOSIS — M25.562 ACUTE PAIN OF LEFT KNEE: ICD-10-CM

## 2021-04-07 DIAGNOSIS — M70.70 ISCHIAL BURSITIS, UNSPECIFIED LATERALITY: ICD-10-CM

## 2021-04-07 DIAGNOSIS — R42 VERTIGO: ICD-10-CM

## 2021-04-07 DIAGNOSIS — I10 ESSENTIAL HYPERTENSION: Primary | ICD-10-CM

## 2021-04-07 PROCEDURE — 73562 X-RAY EXAM OF KNEE 3: CPT

## 2021-04-07 PROCEDURE — 99214 OFFICE O/P EST MOD 30 MIN: CPT | Performed by: FAMILY MEDICINE

## 2021-04-07 RX ORDER — LISINOPRIL 5 MG/1
5 TABLET ORAL DAILY
Qty: 90 TABLET | Refills: 3 | Status: SHIPPED | OUTPATIENT
Start: 2021-04-07 | End: 2021-07-21 | Stop reason: SDUPTHER

## 2021-04-07 NOTE — PATIENT INSTRUCTIONS
Patient Education        DASH Diet: Care Instructions  Your Care Instructions     The DASH diet is an eating plan that can help lower your blood pressure. DASH stands for Dietary Approaches to Stop Hypertension. Hypertension is high blood pressure. The DASH diet focuses on eating foods that are high in calcium, potassium, and magnesium. These nutrients can lower blood pressure. The foods that are highest in these nutrients are fruits, vegetables, low-fat dairy products, nuts, seeds, and legumes. But taking calcium, potassium, and magnesium supplements instead of eating foods that are high in those nutrients does not have the same effect. The DASH diet also includes whole grains, fish, and poultry. The DASH diet is one of several lifestyle changes your doctor may recommend to lower your high blood pressure. Your doctor may also want you to decrease the amount of sodium in your diet. Lowering sodium while following the DASH diet can lower blood pressure even further than just the DASH diet alone. Follow-up care is a key part of your treatment and safety. Be sure to make and go to all appointments, and call your doctor if you are having problems. It's also a good idea to know your test results and keep a list of the medicines you take. How can you care for yourself at home? Following the DASH diet  · Eat 4 to 5 servings of fruit each day. A serving is 1 medium-sized piece of fruit, ½ cup chopped or canned fruit, 1/4 cup dried fruit, or 4 ounces (½ cup) of fruit juice. Choose fruit more often than fruit juice. · Eat 4 to 5 servings of vegetables each day. A serving is 1 cup of lettuce or raw leafy vegetables, ½ cup of chopped or cooked vegetables, or 4 ounces (½ cup) of vegetable juice. Choose vegetables more often than vegetable juice. · Get 2 to 3 servings of low-fat and fat-free dairy each day. A serving is 8 ounces of milk, 1 cup of yogurt, or 1 ½ ounces of cheese.   · Eat 6 to 8 servings of grains each with a low-fat dip as an appetizer instead of chips and dip. ? Sprinkle sunflower seeds or chopped almonds over salads. Or try adding chopped walnuts or almonds to cooked vegetables. ? Try some vegetarian meals using beans and peas. Add garbanzo or kidney beans to salads. Make burritos and tacos with mashed marrero beans or black beans. Where can you learn more? Go to https://Phoenix S&T.Care at Hand. org and sign in to your Bundle Buy account. Enter W762 in the [employer] z o.o. box to learn more about \"DASH Diet: Care Instructions. \"     If you do not have an account, please click on the \"Sign Up Now\" link. Current as of: August 31, 2020               Content Version: 12.8  © 3908-1446 [employer]. Care instructions adapted under license by [facility] ([facility]). If you have questions about a medical condition or this instruction, always ask your healthcare professional. [place] [address] any warranty or liability for your use of this information. Patient Education        Knee: Exercises  Introduction  Here are some examples of exercises for you to try. The exercises may be suggested for a condition or for rehabilitation. Start each exercise slowly. Ease off the exercises if you start to have pain. You will be told when to start these exercises and which ones will work best for you. How to do the exercises  Quad sets   1. Sit with your leg straight and supported on the floor or a firm bed. (If you feel discomfort in the front or back of your knee, place a small towel roll under your knee.)  2. Tighten the muscles on top of your thigh by pressing the back of your knee flat down to the floor. (If you feel discomfort under your kneecap, place a small towel roll under your knee.)  3. Hold for about 6 seconds, then rest for up to 10 seconds. 4. Do 8 to 12 repetitions several times a day. Straight-leg raises to the front   1.  Lie on your back with your good knee bent so that your foot rests flat on the floor. Your injured leg should be straight. Make sure that your low back has a normal curve. You should be able to slip your flat hand in between the floor and the small of your back, with your palm touching the floor and your back touching the back of your hand. 2. Tighten the thigh muscles in the injured leg by pressing the back of your knee flat down to the floor. Hold your knee straight. 3. Keeping the thigh muscles tight, lift your injured leg up so that your heel is about 12 inches off the floor. Hold for about 6 seconds and then lower slowly. 4. Do 8 to 12 repetitions, 3 times a day. Straight-leg raises to the outside   1. Lie on your side, with your injured leg on top. 2. Tighten the front thigh muscles of your injured leg to keep your knee straight. 3. Keep your hip and your leg straight in line with the rest of your body, and keep your knee pointing forward. Do not drop your hip back. 4. Lift your injured leg straight up toward the ceiling, about 12 inches off the floor. Hold for about 6 seconds, then slowly lower your leg. 5. Do 8 to 12 repetitions. Straight-leg raises to the back   1. Lie on your stomach, and lift your leg straight up behind you (toward the ceiling). 2. Lift your toes about 6 inches off the floor, hold for about 6 seconds, then lower slowly. 3. Do 8 to 12 repetitions. Straight-leg raises to the inside   1. Lie on the side of your body with the injured leg. 2. You can either prop your other (good) leg up on a chair, or you can bend your good knee and put that foot in front of your injured knee. Do not drop your hip back. 3. Tighten the muscles on the front of your thigh to straighten your injured knee. 4. Keep your kneecap pointing forward, and lift your whole leg up toward the ceiling about 6 inches. Hold for about 6 seconds, then lower slowly. 5. Do 8 to 12 repetitions. Heel dig bridging   1.  Lie on your back with both knees bent and your ankles bent so that only your heels are digging into the floor. Your knees should be bent about 90 degrees. 2. Then push your heels into the floor, squeeze your buttocks, and lift your hips off the floor until your shoulders, hips, and knees are all in a straight line. 3. Hold for about 6 seconds as you continue to breathe normally, and then slowly lower your hips back down to the floor and rest for up to 10 seconds. 4. Do 8 to 12 repetitions. Hamstring curls   1. Lie on your stomach with your knees straight. If your kneecap is uncomfortable, roll up a washcloth and put it under your leg just above your kneecap. 2. Lift the foot of your injured leg by bending the knee so that you bring the foot up toward your buttock. If this motion hurts, try it without bending your knee quite as far. This may help you avoid any painful motion. 3. Slowly lower your leg back to the floor. 4. Do 8 to 12 repetitions. 5. With permission from your doctor or physical therapist, you may also want to add a cuff weight to your ankle (not more than 5 pounds). With weight, you do not have to lift your leg more than 12 inches to get a hamstring workout. Shallow standing knee bends   Do this exercise only if you have very little pain; if you have no clicking, locking, or giving way if you have an injured knee; and if it does not hurt while you are doing 8 to 12 repetitions. 1. Stand with your hands lightly resting on a counter or chair in front of you. Put your feet shoulder-width apart. 2. Slowly bend your knees so that you squat down like you are going to sit in a chair. Make sure your knees do not go in front of your toes. 3. Lower yourself about 6 inches. Your heels should remain on the floor at all times. 4. Rise slowly to a standing position. Heel raises   1. Stand with your feet a few inches apart, with your hands lightly resting on a counter or chair in front of you.   2. Slowly raise your heels off the floor while keeping your knees straight. 3. Hold for about 6 seconds, then slowly lower your heels to the floor. 4. Do 8 to 12 repetitions several times during the day. Follow-up care is a key part of your treatment and safety. Be sure to make and go to all appointments, and call your doctor if you are having problems. It's also a good idea to know your test results and keep a list of the medicines you take. Where can you learn more? Go to [email or web address].[employer]. org and sign in to your Snappli account. Enter A056 in the [employer] box to learn more about \"Knee: Exercises. \"     If you do not have an account, please click on the \"Sign Up Now\" link. Current as of: November 16, 2020               Content Version: 12.8  © 2260-7588 [employer] Incorporated. Care instructions adapted under license by [facility] ([facility]). If you have questions about a medical condition or this instruction, always ask your healthcare professional. [place] [address] any warranty or liability for your use of this information.

## 2021-04-07 NOTE — PROGRESS NOTES
[address] [place] [address] [address], [address], [place]  Phone:  [phone]  PFP:[phone]       Name: Joyce Zamarripa  : 1955    Chief Complaint   Patient presents with    Knee Pain     left knee pain  aches at night    Dizziness       HPI:     Joyce Zamarripa is a 76 y.o. female who presents today for     HPI     Left knee pain for a year but worse since feb when she fell. It is not getting any better. More pain now. IBU helps. Getting on her knees increases the pain. 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 She feels that these activities put a lot of strain on her knees. H/o ischial bursitis and is concerned that is going to get worse in the summer months because she will be doing up-and-down movement outside on the yard. She has heard about injections and is willing to consider them if the pain is worsened and not help ibuprofen. This was diagnosed by Dr. Geovani Corral about a year ago. Blood pressure seems to be a problem. Patient brings in a log showing blood pressures elevated in the last couple of weeks. No chest pain or shortness of breath. She will say maybe a headache or little bit of dizziness on and off. The dizziness she describes more like a vertigo and is not severe or consistent. She does not get nauseous. She denies any recent head trauma. She does admit to using more salt than she should.       Current Outpatient Medications:     lisinopril (PRINIVIL;ZESTRIL) 5 MG tablet, Take 1 tablet by mouth daily, Disp: 90 tablet, Rfl: 3    Multiple Vitamins-Minerals ([facility] EYE HEALTH ADULT 50+ PO), Take by mouth daily, Disp: , Rfl:     levothyroxine (SYNTHROID) 175 MCG tablet, 1 po daily 6 days except 1 day do 1/2 tablet, Disp: 90 tablet, Rfl: 3    ibuprofen (ADVIL;MOTRIN) 600 MG tablet, Take 1 tablet by mouth 3 times daily as needed for Pain, Disp: 60 tablet, Rfl: 0    blood glucose test strips (ASCENSIA AUTODISC VI;ONE TOUCH ULTRA TEST VI) strip, One Touch Ultra 2 testing strips. Use once daily. DX:R73.09, Disp: 100 each, Rfl: 3    PARoxetine (PAXIL) 40 MG tablet, Take 1 tablet by mouth every morning, Disp: 90 tablet, Rfl: 3    busPIRone (BUSPAR) 10 MG tablet, Take 1 tablet by mouth 3 times daily, Disp: 90 tablet, Rfl: 5    metFORMIN (GLUCOPHAGE) 500 MG tablet, Take 1 tablet by mouth 2 times daily (with meals), Disp: 180 tablet, Rfl: 1    Blood Glucose Monitoring Suppl (ONE TOUCH ULTRA 2) w/Device KIT, One Touch Ultra 2 glucometer. Tests once daily. DX:R73.09, Disp: 1 kit, Rfl: 0    ONE TOUCH LANCETS MISC, One Touch Lancets. Uses once daily. DX:R73.09, Disp: 100 each, Rfl: 3    Allergies   Allergen Reactions    Ceclor [Cefaclor] Diarrhea    Cleocin [Clindamycin Hcl] Diarrhea    Penicillins Nausea Only     Stomach pain like ulcer pain    Sulfa Antibiotics Diarrhea    Vibramycin [Doxycycline Calcium] Diarrhea       Review of Systems   Constitutional: Negative for fatigue and fever. HENT: Negative for congestion and tinnitus. Respiratory: Negative for shortness of breath. Cardiovascular: Negative for chest pain and leg swelling. Musculoskeletal: Positive for arthralgias and joint swelling. Neurological: Positive for dizziness and headaches. Psychiatric/Behavioral: The patient is not nervous/anxious. Admits to good amount of stress at the home. Objective:     BP (!) 140/62 (Site: Left Upper Arm, Position: Sitting, Cuff Size: Large Adult)   Pulse 76   Temp 98 °F (36.7 °C) (Temporal)   Wt 247 lb (112 kg)   BMI 42.40 kg/m²     Physical Exam  Constitutional:       General: She is not in acute distress. Appearance: Normal appearance. She is not ill-appearing. HENT:      Head: Normocephalic. Mouth/Throat:      Mouth: Mucous membranes are moist.      Pharynx: No oropharyngeal exudate. Eyes:      Conjunctiva/sclera: Conjunctivae normal.      Pupils: Pupils are equal, round, and reactive to light.    Cardiovascular:      Rate and Rhythm: Normal rate and regular rhythm. Heart sounds: No murmur. Pulmonary:      Effort: Pulmonary effort is normal. No respiratory distress. Breath sounds: Normal breath sounds. No wheezing. Musculoskeletal:         General: Tenderness (generalized around left knee, +joint effusion small, stiff) present. No swelling. Neurological:      General: No focal deficit present. Mental Status: She is alert and oriented to person, place, and time. Psychiatric:         Mood and Affect: Mood normal.         Behavior: Behavior normal.         Assessment/Plan:     Regino Alvarez was seen today for knee pain and dizziness. Diagnoses and all orders for this visit:    Essential hypertension  -     lisinopril (PRINIVIL;ZESTRIL) 5 MG tablet; Take 1 tablet by mouth daily    Vertigo    Acute pain of left knee  -     XR KNEE LEFT (3 VIEWS); Future    Ischial bursitis, unspecified laterality    Start medication but also work on lowering sodium and improving diet further. Healthy activity that she can tolerate would be wise as well. We will see if the vertigo improves with blood pressure control and lowering the sodium. Otherwise we will continue to evaluate health of the hearing and vestibular system. Exercises to be focused on improving knee range of motion and see if can decrease some of the inflammation. She may use some of the ibuprofen as needed. Will be cautious with the stomach and also the blood pressure issues. Depending on x-ray we will consider other modalities including physical therapy, injection or referral to orthopedics. Return in about 2 months (around 6/7/2021) for left knee and BP check . Future Appointments   Date Time Provider Nishi Lombardo   9/1/2021  9:00 AM Niesha Rodriguez MD [facility] [place] [facility]          This office note has been dictated. Effort was made to review for errors but some may have been missed. Please contact Lynnette Marker of note for clarification if needed. Electronically signed by Luis Redmond MD on 4/7/2021 at 5:18 PM

## 2021-04-08 ASSESSMENT — ENCOUNTER SYMPTOMS: SHORTNESS OF BREATH: 0

## 2021-07-21 ENCOUNTER — OFFICE VISIT (OUTPATIENT)
Dept: FAMILY MEDICINE CLINIC | Age: 66
End: 2021-07-21
Payer: MEDICARE

## 2021-07-21 VITALS
SYSTOLIC BLOOD PRESSURE: 148 MMHG | BODY MASS INDEX: 42.85 KG/M2 | HEIGHT: 64 IN | OXYGEN SATURATION: 98 % | WEIGHT: 251 LBS | HEART RATE: 76 BPM | TEMPERATURE: 96.9 F | DIASTOLIC BLOOD PRESSURE: 72 MMHG

## 2021-07-21 DIAGNOSIS — E03.9 ACQUIRED HYPOTHYROIDISM: ICD-10-CM

## 2021-07-21 DIAGNOSIS — H92.03 OTALGIA, BILATERAL: Primary | ICD-10-CM

## 2021-07-21 DIAGNOSIS — G89.29 CHRONIC PAIN OF LEFT KNEE: ICD-10-CM

## 2021-07-21 DIAGNOSIS — L21.9 SEBORRHEIC DERMATITIS: ICD-10-CM

## 2021-07-21 DIAGNOSIS — I10 ESSENTIAL HYPERTENSION: ICD-10-CM

## 2021-07-21 DIAGNOSIS — M25.562 CHRONIC PAIN OF LEFT KNEE: ICD-10-CM

## 2021-07-21 DIAGNOSIS — M54.50 LUMBAR BACK PAIN: ICD-10-CM

## 2021-07-21 PROCEDURE — 99214 OFFICE O/P EST MOD 30 MIN: CPT | Performed by: FAMILY MEDICINE

## 2021-07-21 RX ORDER — LEVOTHYROXINE AND LIOTHYRONINE 57; 13.5 UG/1; UG/1
90 TABLET ORAL DAILY
Qty: 90 TABLET | Refills: 3 | Status: SHIPPED | OUTPATIENT
Start: 2021-07-21 | End: 2021-07-25 | Stop reason: SDUPTHER

## 2021-07-21 RX ORDER — LISINOPRIL 10 MG/1
10 TABLET ORAL DAILY
Qty: 90 TABLET | Refills: 3 | Status: SHIPPED | OUTPATIENT
Start: 2021-07-21 | End: 2022-03-04 | Stop reason: SINTOL

## 2021-07-21 NOTE — PROGRESS NOTES
[address] [place] [address] Rd, [address], [place]  Phone:  [phone]  [facility]:[phone]       Name: Ezra Hooker  : 1955    Chief Complaint   Patient presents with   [place]       HPI:     Ezra Hooker is a 76 y.o. female who presents today for     HPI    3 days of pain in canals, with some itching. No drainage. Sometimes gets little flakes. Has been using old drops on and off last week. Wasn't sure if safe due to how old it was. Constant \"white noise\" in ears. Stopped synthroid due to diarrhea. Wants changed to [place] Thyroid. HTN -- elevated here. Blames on stress. However it has been high. Diet not great. BMI 43. Handicap placard requested for back and knee pain. She tries to stay active. However, she feels unsafe at times when her joints are acting up. Current Outpatient Medications:     busPIRone (BUSPAR) 10 MG tablet, Take 1 tablet by mouth 3 times daily, Disp: 90 tablet, Rfl: 1    neomycin-polymyxin-hydrocortisone (CORTISPORIN) 3.5-19271-4 otic solution, Place 3 drops into both ears 3 times daily for 10 days, Disp: 1 Bottle, Rfl: 0    [facility], by Does not apply route Expiration: 5 years, Disp: 1 each, Rfl: 0    lisinopril (PRINIVIL;ZESTRIL) 5 MG tablet, Take 1 tablet by mouth daily, Disp: 90 tablet, Rfl: 3    Multiple Vitamins-Minerals ([facility] EYE HEALTH ADULT 50+ PO), Take by mouth daily, Disp: , Rfl:     levothyroxine (SYNTHROID) 175 MCG tablet, 1 po daily 6 days except 1 day do 1/2 tablet, Disp: 90 tablet, Rfl: 3    ibuprofen (ADVIL;MOTRIN) 600 MG tablet, Take 1 tablet by mouth 3 times daily as needed for Pain, Disp: 60 tablet, Rfl: 0    blood glucose test strips (ASCENSIA AUTODISC VI;ONE TOUCH ULTRA TEST VI) strip, One Touch Ultra 2 testing strips. Use once daily.  DX:R73.09, Disp: 100 each, Rfl: 3    PARoxetine (PAXIL) 40 MG tablet, Take 1 tablet by mouth every morning, Disp: 90 tablet, Rfl: 3    metFORMIN (GLUCOPHAGE) 500 MG tablet, Take 1 tablet by mouth 2 times daily (with meals), Disp: 180 tablet, Rfl: 1    Blood Glucose Monitoring Suppl (ONE TOUCH ULTRA 2) w/Device KIT, One Touch Ultra 2 glucometer. Tests once daily. DX:R73.09, Disp: 1 kit, Rfl: 0    ONE TOUCH LANCETS MISC, One Touch Lancets. Uses once daily. DX:R73.09, Disp: 100 each, Rfl: 3    Allergies   Allergen Reactions    Ceclor [Cefaclor] Diarrhea    Cleocin [Clindamycin Hcl] Diarrhea    Penicillins Nausea Only     Stomach pain like ulcer pain    Sulfa Antibiotics Diarrhea    Vibramycin [Doxycycline Calcium] Diarrhea       Review of Systems   Constitutional: Negative for fatigue and fever. Respiratory: Negative for shortness of breath. Cardiovascular: Negative for chest pain and leg swelling. Objective:     BP (!) 150/78 (Site: Left Upper Arm, Position: Sitting, Cuff Size: Large Adult)   Pulse 76   Temp 96.9 °F (36.1 °C) (Skin)   Ht 5' 4\" (1.626 m)   Wt 251 lb (113.9 kg)   SpO2 98%   BMI 43.08 kg/m²     Physical Exam  Constitutional:       General: She is not in acute distress. Appearance: Normal appearance. She is not ill-appearing. HENT:      Right Ear: Tympanic membrane normal.      Left Ear: Tympanic membrane normal.      Ears:      Comments: Bilateral external ears and canals are mildly erythematous. The right side has slight erythema and puffiness, but not warm. There increased flakiness in canal. No drainage. Cardiovascular:      Rate and Rhythm: Normal rate and regular rhythm. Heart sounds: No murmur heard. Pulmonary:      Effort: Pulmonary effort is normal. No respiratory distress. Breath sounds: Normal breath sounds. No wheezing. Musculoskeletal:         General: No swelling. Lymphadenopathy:      Cervical: No cervical adenopathy. Neurological:      Mental Status: She is alert. Psychiatric:         Mood and Affect: Mood normal.         Assessment/Plan:     Nilesh Barr was seen today for otalgia.     Diagnoses and all orders for this visit:    Otalgia, bilateral  -     neomycin-polymyxin-hydrocortisone (CORTISPORIN) 3.5-64173-3 otic solution; Place 3 drops into both ears 3 times daily for 10 days    Seborrheic dermatitis    Chronic pain of left knee  -     Handicap Placard MISC; by Does not apply route Expiration: 5 years    Lumbar back pain  -     Handicap Placard MISC; by Does not apply route Expiration: 5 years    Essential hypertension  -     lisinopril (PRINIVIL;ZESTRIL) 10 MG tablet; Take 1 tablet by mouth daily    Acquired hypothyroidism  -      thyroid (ARMOUR THYROID) 90 MG tablet; Take 1 tablet by mouth daily    Discussed management of seborrheic dermatitis. She has limited income, will use above drops. Lisinopril will increase from 5 to 10 mg. Will change thyroid medication. Return for 9/1/2021. Future Appointments   Date Time Provider Nishi Lombardo   9/1/2021  9:00 AM Madai Petit MD [facility] [place]          This office note has been dictated. Effort was made to review for errors but some may have been missed. Please contact Bulmaro Barros of [employer] for clarification if needed.        Electronically signed by Madai Petit MD on 7/21/2021 at 11:49 AM

## 2021-07-23 DIAGNOSIS — E03.9 ACQUIRED HYPOTHYROIDISM: ICD-10-CM

## 2021-07-23 NOTE — TELEPHONE ENCOUNTER
Called Teresa Mcfarland to try and get help with prescription for patient. She states there is no patient assistance for this medication. I have pended medication to be sent to [facility].

## 2021-07-25 RX ORDER — LEVOTHYROXINE AND LIOTHYRONINE 57; 13.5 UG/1; UG/1
90 TABLET ORAL DAILY
Qty: 90 TABLET | Refills: 3 | Status: SHIPPED | OUTPATIENT
Start: 2021-07-25 | End: 2021-08-17 | Stop reason: SDUPTHER

## 2021-07-25 NOTE — TELEPHONE ENCOUNTER
Please notify patient of information found regarding MAP and that per insurance info available to us, she may be paying about $50 per mo for this. I have sent a 90/3 script.

## 2021-07-26 ASSESSMENT — ENCOUNTER SYMPTOMS: SHORTNESS OF BREATH: 0

## 2021-08-13 ENCOUNTER — HOSPITAL ENCOUNTER (OUTPATIENT)
Age: 66
Discharge: HOME | End: 2021-08-13
Payer: MEDICARE

## 2021-08-13 DIAGNOSIS — E66.01 MORBIDLY OBESE (HCC): ICD-10-CM

## 2021-08-13 DIAGNOSIS — F32.5 MAJOR DEPRESSIVE DISORDER WITH SINGLE EPISODE, IN FULL REMISSION (HCC): ICD-10-CM

## 2021-08-13 DIAGNOSIS — E03.9 ACQUIRED HYPOTHYROIDISM: ICD-10-CM

## 2021-08-13 DIAGNOSIS — E11.65 TYPE 2 DIABETES MELLITUS WITH HYPERGLYCEMIA, WITHOUT LONG-TERM CURRENT USE OF INSULIN (HCC): ICD-10-CM

## 2021-08-13 LAB
ALBUMIN SERPL-MCNC: 4.4 G/DL (ref 3.5–5.1)
ALP BLD-CCNC: 88 U/L (ref 38–126)
ALT SERPL-CCNC: 17 U/L (ref 11–66)
ANION GAP SERPL CALCULATED.3IONS-SCNC: 11 MEQ/L (ref 8–16)
AST SERPL-CCNC: 17 U/L (ref 5–40)
AVERAGE GLUCOSE: 168 MG/DL (ref 70–126)
BASOPHILS # BLD: 1.1 %
BASOPHILS ABSOLUTE: 0.1 THOU/MM3 (ref 0–0.1)
BILIRUB SERPL-MCNC: 0.6 MG/DL (ref 0.3–1.2)
BUN BLDV-MCNC: 8 MG/DL (ref 7–22)
CALCIUM SERPL-MCNC: 9.5 MG/DL (ref 8.5–10.5)
CHLORIDE BLD-SCNC: 104 MEQ/L (ref 98–111)
CHOLESTEROL, TOTAL: 175 MG/DL (ref 100–199)
CO2: 26 MEQ/L (ref 23–33)
CREAT SERPL-MCNC: 0.8 MG/DL (ref 0.4–1.2)
EOSINOPHIL # BLD: 3.8 %
EOSINOPHILS ABSOLUTE: 0.3 THOU/MM3 (ref 0–0.4)
ERYTHROCYTE [DISTWIDTH] IN BLOOD BY AUTOMATED COUNT: 13.1 % (ref 11.5–14.5)
ERYTHROCYTE [DISTWIDTH] IN BLOOD BY AUTOMATED COUNT: 43.9 FL (ref 35–45)
GFR SERPL CREATININE-BSD FRML MDRD: 72 ML/MIN/1.73M2
GLUCOSE BLD-MCNC: 180 MG/DL (ref 70–108)
HBA1C MFR BLD: 7.6 % (ref 4.4–6.4)
HCT VFR BLD CALC: 45.7 % (ref 37–47)
HDLC SERPL-MCNC: 41 MG/DL
HEMOGLOBIN: 14.6 GM/DL (ref 12–16)
IMMATURE GRANS (ABS): 0.03 THOU/MM3 (ref 0–0.07)
IMMATURE GRANULOCYTES: 0.3 %
LDL CHOLESTEROL CALCULATED: 111 MG/DL
LYMPHOCYTES # BLD: 24.6 %
LYMPHOCYTES ABSOLUTE: 2.3 THOU/MM3 (ref 1–4.8)
MAGNESIUM: 1.9 MG/DL (ref 1.6–2.4)
MCH RBC QN AUTO: 29.3 PG (ref 26–33)
MCHC RBC AUTO-ENTMCNC: 31.9 GM/DL (ref 32.2–35.5)
MCV RBC AUTO: 91.8 FL (ref 81–99)
MONOCYTES # BLD: 8 %
MONOCYTES ABSOLUTE: 0.7 THOU/MM3 (ref 0.4–1.3)
NUCLEATED RED BLOOD CELLS: 0 /100 WBC
PLATELET # BLD: 352 THOU/MM3 (ref 130–400)
PMV BLD AUTO: 11.1 FL (ref 9.4–12.4)
POTASSIUM SERPL-SCNC: 4 MEQ/L (ref 3.5–5.2)
RBC # BLD: 4.98 MILL/MM3 (ref 4.2–5.4)
SEG NEUTROPHILS: 62.2 %
SEGMENTED NEUTROPHILS ABSOLUTE COUNT: 5.7 THOU/MM3 (ref 1.8–7.7)
SODIUM BLD-SCNC: 141 MEQ/L (ref 135–145)
T4 FREE: 0.61 NG/DL (ref 0.93–1.76)
TOTAL PROTEIN: 7 G/DL (ref 6.1–8)
TRIGL SERPL-MCNC: 115 MG/DL (ref 0–199)
TSH SERPL DL<=0.05 MIU/L-ACNC: 10.33 UIU/ML (ref 0.4–4.2)
VITAMIN B-12: 599 PG/ML (ref 211–911)
WBC # BLD: 9.2 THOU/MM3 (ref 4.8–10.8)

## 2021-08-13 PROCEDURE — 36415 COLL VENOUS BLD VENIPUNCTURE: CPT

## 2021-08-13 PROCEDURE — 84439 ASSAY OF FREE THYROXINE: CPT

## 2021-08-13 PROCEDURE — 80053 COMPREHEN METABOLIC PANEL: CPT

## 2021-08-13 PROCEDURE — 82607 VITAMIN B-12: CPT

## 2021-08-13 PROCEDURE — 80061 LIPID PANEL: CPT

## 2021-08-13 PROCEDURE — 85025 COMPLETE CBC W/AUTO DIFF WBC: CPT

## 2021-08-13 PROCEDURE — 83036 HEMOGLOBIN GLYCOSYLATED A1C: CPT

## 2021-08-13 PROCEDURE — 84443 ASSAY THYROID STIM HORMONE: CPT

## 2021-08-13 PROCEDURE — 83735 ASSAY OF MAGNESIUM: CPT

## 2021-08-17 ENCOUNTER — TELEPHONE (OUTPATIENT)
Dept: FAMILY MEDICINE CLINIC | Age: 66
End: 2021-08-17

## 2021-08-17 DIAGNOSIS — E03.9 ACQUIRED HYPOTHYROIDISM: ICD-10-CM

## 2021-08-17 RX ORDER — LEVOTHYROXINE AND LIOTHYRONINE 76; 18 UG/1; UG/1
120 TABLET ORAL DAILY
Qty: 90 TABLET | Refills: 3 | Status: SHIPPED
Start: 2021-08-17 | End: 2021-08-19 | Stop reason: DRUGHIGH

## 2021-08-17 NOTE — RESULT ENCOUNTER NOTE
Please let patient know that her thyroid is underactive with current dose. I see her in a couple of weeks, but I wanted her to get started on the higher dose (120 mg [place]) now.

## 2021-08-17 NOTE — TELEPHONE ENCOUNTER
----- Message from Romi Glass MD sent at 8/17/2021  6:50 AM EDT -----  Please let patient know that her thyroid is underactive with current dose. I see her in a couple of weeks, but I wanted her to get started on the higher dose (120 mg [place]) now.

## 2021-08-18 DIAGNOSIS — E03.9 ACQUIRED HYPOTHYROIDISM: Primary | ICD-10-CM

## 2021-08-18 NOTE — TELEPHONE ENCOUNTER
Called and spoke with patient about resutls and medications. She states she was not taking thyroid medication for lab test. States she was unable to get medication from pharmacy because they did not have it in stock. After speaking with patient I explained I would try and find where it is available and we will send script in. We need to notify patient where we send prescription.

## 2021-08-18 NOTE — TELEPHONE ENCOUNTER
Reed Campbell called requesting a refill on the following medications:  Requested Prescriptions     Pending Prescriptions Disp Refills    thyroid (ARMOUR THYROID) 90 MG tablet 30 tablet 3     Sig: Take 1 tablet by mouth daily       Date of last visit: 7/21/2021  Date of next visit (if applicable):9/1/2021  Date of last refill 8/18/2021  Pharmacy Name: Argenis Bui MA

## 2021-08-19 RX ORDER — LEVOTHYROXINE AND LIOTHYRONINE 57; 13.5 UG/1; UG/1
90 TABLET ORAL DAILY
Qty: 30 TABLET | Refills: 3 | Status: SHIPPED | OUTPATIENT
Start: 2021-08-19 | End: 2021-08-21 | Stop reason: SDUPTHER

## 2021-08-20 DIAGNOSIS — E03.9 ACQUIRED HYPOTHYROIDISM: ICD-10-CM

## 2021-08-20 NOTE — TELEPHONE ENCOUNTER
Bishnu Wheeler called requesting a refill on the following medications:  Requested Prescriptions     Pending Prescriptions Disp Refills    thyroid (ARMOUR THYROID) 90 MG tablet 30 tablet 3     Sig: Take 1 tablet by mouth daily       Date of last visit: 7/21/2021  Date of next visit (if applicable):9/1/2021  Date of last refill: 08/19/20  Pharmacy Name: [address]  Patient request send to [facility] it cost less.     Thanks,  Niesha Vera, CMA

## 2021-08-21 RX ORDER — LEVOTHYROXINE AND LIOTHYRONINE 57; 13.5 UG/1; UG/1
90 TABLET ORAL DAILY
Qty: 30 TABLET | Refills: 3 | Status: SHIPPED | OUTPATIENT
Start: 2021-08-21 | End: 2021-09-01 | Stop reason: SDUPTHER

## 2021-09-01 ENCOUNTER — OFFICE VISIT (OUTPATIENT)
Dept: FAMILY MEDICINE CLINIC | Age: 66
End: 2021-09-01
Payer: MEDICARE

## 2021-09-01 VITALS
TEMPERATURE: 96.9 F | DIASTOLIC BLOOD PRESSURE: 60 MMHG | HEART RATE: 79 BPM | WEIGHT: 242 LBS | HEIGHT: 64 IN | SYSTOLIC BLOOD PRESSURE: 136 MMHG | OXYGEN SATURATION: 98 % | BODY MASS INDEX: 41.32 KG/M2

## 2021-09-01 DIAGNOSIS — E03.9 ACQUIRED HYPOTHYROIDISM: ICD-10-CM

## 2021-09-01 DIAGNOSIS — R01.1 MURMUR: Chronic | ICD-10-CM

## 2021-09-01 DIAGNOSIS — K21.9 GASTROESOPHAGEAL REFLUX DISEASE WITHOUT ESOPHAGITIS: ICD-10-CM

## 2021-09-01 DIAGNOSIS — F33.42 RECURRENT MAJOR DEPRESSIVE DISORDER, IN FULL REMISSION (HCC): ICD-10-CM

## 2021-09-01 DIAGNOSIS — E11.65 TYPE 2 DIABETES MELLITUS WITH HYPERGLYCEMIA, WITHOUT LONG-TERM CURRENT USE OF INSULIN (HCC): Primary | ICD-10-CM

## 2021-09-01 DIAGNOSIS — F41.1 GENERALIZED ANXIETY DISORDER: ICD-10-CM

## 2021-09-01 DIAGNOSIS — H61.23 BILATERAL IMPACTED CERUMEN: ICD-10-CM

## 2021-09-01 PROCEDURE — 3051F HG A1C>EQUAL 7.0%<8.0%: CPT | Performed by: FAMILY MEDICINE

## 2021-09-01 PROCEDURE — 99214 OFFICE O/P EST MOD 30 MIN: CPT | Performed by: FAMILY MEDICINE

## 2021-09-01 RX ORDER — METFORMIN HYDROCHLORIDE 750 MG/1
1500 TABLET, EXTENDED RELEASE ORAL
Qty: 180 TABLET | Refills: 3 | Status: SHIPPED | OUTPATIENT
Start: 2021-09-01 | End: 2022-08-16 | Stop reason: SDUPTHER

## 2021-09-01 RX ORDER — LEVOTHYROXINE AND LIOTHYRONINE 57; 13.5 UG/1; UG/1
90 TABLET ORAL DAILY
Qty: 30 TABLET | Refills: 3 | Status: SHIPPED | OUTPATIENT
Start: 2021-09-01 | End: 2021-12-01 | Stop reason: SDUPTHER

## 2021-09-01 ASSESSMENT — ENCOUNTER SYMPTOMS: SHORTNESS OF BREATH: 0

## 2021-09-01 NOTE — PROGRESS NOTES
Rebecca Rodriguez (:  1955) is a 76 y.o. female,Established patient, here for evaluation of the following chief complaint(s):  6 Month Follow-Up, Hypertension, and Insomnia         ASSESSMENT/PLAN:  1. Type 2 diabetes mellitus with hyperglycemia, without long-term current use of insulin (HCC)  -     metFORMIN (GLUCOPHAGE XR) 750 MG extended release tablet; Take 2 tablets by mouth daily (with breakfast), Disp-180 tablet, R-3Normal  -     TSH With Reflex Ft4; Future  -     Hemoglobin A1C; Future  2. Acquired hypothyroidism  -     thyroid (ARMOUR THYROID) 90 MG tablet; Take 1 tablet by mouth daily, Disp-30 tablet, R-3Print  -     TSH With Reflex Ft4; Future  -     Hemoglobin A1C; Future  3. Murmur---3/ harsh A2---Mild AS  4. Gastroesophageal reflux disease without esophagitis  5. Recurrent major depressive disorder, in full remission ([address]  6. Generalized anxiety disorder  7. Bilateral impacted cerumen    Continue current medications and she will take her [place] Thyroid consistently. Dietary counseling given to continue improve her choices especially of beverages. Different ideas given and shared. She will use over-the-counter Debrox to help her ears. Plan for echo next office visit. The last one was done in 2019. Patient functionally stable. Return in about 3 months (around 2021). Subjective   SUBJECTIVE/OBJECTIVE:  HPI     Patient with diabetes, GERD and depression. Continues to have a high amount of stress but she believes that she is managing with medications and support that she does have. No significant changes. Her thyroid was not well controlled but then she let us know she was not taking the [place] Thyroid due to coverage issues and not being able to find it in local pharmacies. This now has been resolved. She does want a printed [place] Thyroid prescription so she can continue to shop for the best price. She is paying about $40-$45 per month.  metfomin on board. And tolerating. Eating more salads (iceberg lettuce), with grapes/tomatoes/ham/cheese. Not doing probiotics  Sometimes explosive diarrhea off and on. Forgets to take BS in am.   Regular Dr. Brown Neither 32 oz every day. Review of Systems   Constitutional: Negative for fatigue and fever. Respiratory: Negative for shortness of breath. Cardiovascular: Negative for chest pain and leg swelling. Objective   Physical Exam  Constitutional:       General: She is not in acute distress. Appearance: Normal appearance. She is not ill-appearing. Cardiovascular:      Rate and Rhythm: Normal rate and regular rhythm. Heart sounds: Murmur heard. Pulmonary:      Effort: Pulmonary effort is normal. No respiratory distress. Breath sounds: Normal breath sounds. No wheezing. Musculoskeletal:         General: No swelling. Neurological:      Mental Status: She is alert.    Psychiatric:         Mood and Affect: Mood normal.           Diabetes labs reviewed:  Lab Results   Component Value Date    LABA1C 7.6 (H) 08/13/2021     No results found for: EAG  Lab Results   Component Value Date    LABA1C 7.6 (H) 08/13/2021    LABA1C 6.5 (H) 12/07/2020    LABA1C 7.5 (H) 08/26/2020       Lab Results   Component Value Date     08/13/2021    K 4.0 08/13/2021     08/13/2021    CO2 26 08/13/2021    BUN 8 08/13/2021    CREATININE 0.8 08/13/2021    CALCIUM 9.5 08/13/2021    GLUCOSE 180 (H) 08/13/2021        Lab Results   Component Value Date    CHOL 175 08/13/2021    CHOL 156 08/26/2020    CHOL 165 09/20/2019     Lab Results   Component Value Date    TRIG 115 08/13/2021    TRIG 113 08/26/2020    TRIG 108 09/20/2019     Lab Results   Component Value Date    HDL 41 08/13/2021    HDL 40 08/26/2020    HDL 41 09/20/2019     Lab Results   Component Value Date    LDLCHOLESTEROL 134 (H) 11/08/2016    LDLCALC 111 08/13/2021    LDLCALC 93 08/26/2020    LDLCALC 102 09/20/2019     No results found for: LABVLDL, VLDL  No results found for: [facility]    Lab Results   Component Value Date    LABMICR < 1.20 12/07/2020       Lab Results   Component Value Date    TSH 10.330 (H) 08/13/2021      Lab Results   Component Value Date    [id number] 599 08/13/2021      Lab Results   Component Value Date    MG 1.9 08/13/2021      Lab Results   Component Value Date    ALT 17 08/13/2021    AST 17 08/13/2021    ALKPHOS 88 08/13/2021    BILITOT 0.6 08/13/2021        Lab Results   Component Value Date    WBC 9.2 08/13/2021    HGB 14.6 08/13/2021    HCT 45.7 08/13/2021    MCV 91.8 08/13/2021     08/13/2021     An electronic signature was used to authenticate this note.     --Fredo Ellis MD

## 2021-09-20 ENCOUNTER — TELEPHONE (OUTPATIENT)
Dept: PHARMACY | Facility: CLINIC | Age: 66
End: 2021-09-20

## 2021-09-20 NOTE — TELEPHONE ENCOUNTER
Jayme Ellis MD - would patient benefit from statin therapy? Patient with DM, LDL = 111 and ASCVD risk score = 19.7%. Per ADA guidelines, patient is a candidate for a moderate-intensity statin. Recommend initiating either atorvastatin 10 or 20 mg daily or rosuvastatin 5 or 10 mg daily. I can contact the patient to discuss any changes in therapy. Thank you,  Kaylyn Tellez, PharmD, [address] // Department, toll free [phone], option 1    ==============================================================    [facility]ATION [facility]ARM[facility]Y: STATIN THERAPY REVIEW  Identified statin use in persons with diabetes care gap per [employer]. Records dated: 9/8/21. Last Office Visit: 9/1/21    Patient also appears to be taking: metformin and lisinopril    Patient found in Outcomes [place] and is currently eligible for TIP    ASSESSMENT  ACE/ARB ADHERENCE    Per Insurance Records through 9/5/21 (YTD [place] = 100%; Passed in 2021):   Lisinopril last filled on 7/21/21 for 90 day supply. Next refill due: 1/2/22    BP Readings from Last 3 Encounters:   09/01/21 136/60   07/21/21 (!) 148/72   04/07/21 (!) 140/62     Estimated Creatinine Clearance: 83 mL/min (based on SCr of 0.8 mg/dL). DIABETES ADHERENCE    Per Insurance Records through 9/5/21 (2020 [place] = 87%; YTD [place]C = 100%; Passed in 2021):   Metformin last filled on 9/1/21 for 90 day supply. Next refill due: 2/5/22    Lab Results   Component Value Date    LABA1C 7.6 (H) 08/13/2021    LABA1C 6.5 (H) 12/07/2020    LABA1C 7.5 (H) 08/26/2020     NOTE A1c <9%    STATIN GAP IDENTIFIED    Per chart review, patient has never been prescribed statin therapy.     Lab Results   Component Value Date    CHOL 175 08/13/2021    TRIG 115 08/13/2021    HDL 41 08/13/2021    LDLCHOLESTEROL 134 (H) 11/08/2016    LDLCALC 111 08/13/2021     ALT   Date Value Ref Range Status   08/13/2021 17 11 - 66 U/L Final     Comment: Performed at [facility], [address]     AST   Date Value Ref Range Status   08/13/2021 17 5 - 40 U/L Final       The 10-year ASCVD risk score (Yesika Villalta, et al., 2013) is: 19.7%    Values used to calculate the score:      Age: 78 years      Sex: Female      Is Non- : No      Diabetic: Yes      Tobacco smoker: No      Systolic Blood Pressure: 374 mmHg      Is BP treated: Yes      HDL Cholesterol: 41 mg/dL      Total Cholesterol: 175 mg/dL     Hyperlipidemia Goal: Patient has never been prescribed any-intensity statin therapy. 2021 ADA Guidelines Age:   [facility]  >/= 36years old:   o No history of ASCVD or 10-year ASCVD risk < 20% - moderate-intensity statin is recommended. PLAN  Patient with DM, over 40, LDL > 100 and ASCVD risk score = 19.7%, therefore is a candidate for a moderate-intensity statin based upon current ADA guidelines. Will send recommendation to PCP for consideration today.      Susana Turner, PharmD, [place] // Department, toll free [phone], option 1

## 2021-09-22 NOTE — TELEPHONE ENCOUNTER
Thank you for the response! Attempted to contact the patient to discuss initiating statin therapy. Left message for patient to return my call. Will attempt to contact the patient again.      Krish Sifuentes, PharmD, [place] // Department, toll free [phone], option 1

## 2021-09-23 NOTE — TELEPHONE ENCOUNTER
Second attempt made to contact the patient to discuss starting statin therapy. Left message for patient to return my call. Will prepare and send a [employer] message to the patient today and will dulce maria for f/u on 12/1/21 to send recommendation prior to next OV. Will sign off.      Micheline Schafer, PharmD, [place] // Department, toll free [phone], option 1      For Pharmacy [id number] [place] [address] Road in place:  No   Recommendation Provided To: Provider: 1 via Note to Provider   Intervention Detail: New Rx: 1, reason: Needs Additional Therapy   Gap Closed?: No    Intervention Accepted By: Provider: 1   Time Spent (min): 20

## 2021-11-23 ENCOUNTER — HOSPITAL ENCOUNTER (OUTPATIENT)
Age: 66
Discharge: HOME | End: 2021-11-23
Payer: MEDICARE

## 2021-11-23 ENCOUNTER — PATIENT MESSAGE (OUTPATIENT)
Dept: FAMILY MEDICINE CLINIC | Age: 66
End: 2021-11-23

## 2021-11-23 DIAGNOSIS — E11.65 TYPE 2 DIABETES MELLITUS WITH HYPERGLYCEMIA, WITHOUT LONG-TERM CURRENT USE OF INSULIN (HCC): ICD-10-CM

## 2021-11-23 DIAGNOSIS — E03.9 ACQUIRED HYPOTHYROIDISM: ICD-10-CM

## 2021-11-23 LAB
AVERAGE GLUCOSE: 129 MG/DL (ref 70–126)
HBA1C MFR BLD: 6.3 % (ref 4.4–6.4)
TSH SERPL DL<=0.05 MIU/L-ACNC: 3.21 UIU/ML (ref 0.4–4.2)

## 2021-11-23 PROCEDURE — 83036 HEMOGLOBIN GLYCOSYLATED A1C: CPT

## 2021-11-23 PROCEDURE — 36415 COLL VENOUS BLD VENIPUNCTURE: CPT

## 2021-11-23 PROCEDURE — 84443 ASSAY THYROID STIM HORMONE: CPT

## 2021-11-23 NOTE — TELEPHONE ENCOUNTER
From: Selma Perez  To: Dr. Ronna Cartagena: 11/23/2021 6:00 PM EST  Subject: Test Results Question    today i got blood test. looks like they did not do A1C. .?

## 2021-12-01 ENCOUNTER — OFFICE VISIT (OUTPATIENT)
Dept: FAMILY MEDICINE CLINIC | Age: 66
End: 2021-12-01
Payer: MEDICARE

## 2021-12-01 ENCOUNTER — TELEPHONE (OUTPATIENT)
Dept: PHARMACY | Facility: CLINIC | Age: 66
End: 2021-12-01

## 2021-12-01 VITALS
WEIGHT: 233 LBS | HEART RATE: 77 BPM | HEIGHT: 64 IN | OXYGEN SATURATION: 98 % | DIASTOLIC BLOOD PRESSURE: 68 MMHG | BODY MASS INDEX: 39.78 KG/M2 | SYSTOLIC BLOOD PRESSURE: 134 MMHG

## 2021-12-01 DIAGNOSIS — E03.9 ACQUIRED HYPOTHYROIDISM: ICD-10-CM

## 2021-12-01 DIAGNOSIS — E11.65 TYPE 2 DIABETES MELLITUS WITH HYPERGLYCEMIA, WITHOUT LONG-TERM CURRENT USE OF INSULIN (HCC): ICD-10-CM

## 2021-12-01 DIAGNOSIS — F33.1 MODERATE EPISODE OF RECURRENT MAJOR DEPRESSIVE DISORDER (HCC): ICD-10-CM

## 2021-12-01 DIAGNOSIS — F41.1 GENERALIZED ANXIETY DISORDER: Primary | ICD-10-CM

## 2021-12-01 DIAGNOSIS — Z13.31 POSITIVE DEPRESSION SCREENING: ICD-10-CM

## 2021-12-01 PROCEDURE — G8431 POS CLIN DEPRES SCRN F/U DOC: HCPCS | Performed by: FAMILY MEDICINE

## 2021-12-01 PROCEDURE — 99214 OFFICE O/P EST MOD 30 MIN: CPT | Performed by: FAMILY MEDICINE

## 2021-12-01 RX ORDER — LEVOTHYROXINE AND LIOTHYRONINE 57; 13.5 UG/1; UG/1
90 TABLET ORAL DAILY
Qty: 30 TABLET | Refills: 11 | Status: SHIPPED | OUTPATIENT
Start: 2021-12-01 | End: 2022-09-07 | Stop reason: SDUPTHER

## 2021-12-01 RX ORDER — ATORVASTATIN CALCIUM 10 MG/1
10 TABLET, FILM COATED ORAL DAILY
Qty: 90 TABLET | Refills: 3 | Status: SHIPPED | OUTPATIENT
Start: 2021-12-01

## 2021-12-01 RX ORDER — LAMOTRIGINE 25 MG/1
TABLET ORAL
Qty: 60 TABLET | Refills: 5 | Status: SHIPPED | OUTPATIENT
Start: 2021-12-01 | End: 2022-01-03 | Stop reason: SDUPTHER

## 2021-12-01 RX ORDER — BUSPIRONE HYDROCHLORIDE 10 MG/1
10 TABLET ORAL 3 TIMES DAILY
Qty: 90 TABLET | Refills: 2 | Status: SHIPPED | OUTPATIENT
Start: 2021-12-01 | End: 2022-09-07 | Stop reason: SDUPTHER

## 2021-12-01 SDOH — ECONOMIC STABILITY: FOOD INSECURITY: WITHIN THE PAST 12 MONTHS, THE FOOD YOU BOUGHT JUST DIDN'T LAST AND YOU DIDN'T HAVE MONEY TO GET MORE.: NEVER TRUE

## 2021-12-01 SDOH — ECONOMIC STABILITY: FOOD INSECURITY: WITHIN THE PAST 12 MONTHS, YOU WORRIED THAT YOUR FOOD WOULD RUN OUT BEFORE YOU GOT MONEY TO BUY MORE.: NEVER TRUE

## 2021-12-01 ASSESSMENT — PATIENT HEALTH QUESTIONNAIRE - PHQ9
1. LITTLE INTEREST OR PLEASURE IN DOING THINGS: 3
SUM OF ALL RESPONSES TO PHQ QUESTIONS 1-9: 13
2. FEELING DOWN, DEPRESSED OR HOPELESS: 3
6. FEELING BAD ABOUT YOURSELF - OR THAT YOU ARE A FAILURE OR HAVE LET YOURSELF OR YOUR FAMILY DOWN: 1
10. IF YOU CHECKED OFF ANY PROBLEMS, HOW DIFFICULT HAVE THESE PROBLEMS MADE IT FOR YOU TO DO YOUR WORK, TAKE CARE OF THINGS AT HOME, OR GET ALONG WITH OTHER PEOPLE: 0
7. TROUBLE CONCENTRATING ON THINGS, SUCH AS READING THE NEWSPAPER OR WATCHING TELEVISION: 1
4. FEELING TIRED OR HAVING LITTLE ENERGY: 2
8. MOVING OR SPEAKING SO SLOWLY THAT OTHER PEOPLE COULD HAVE NOTICED. OR THE OPPOSITE, BEING SO FIGETY OR RESTLESS THAT YOU HAVE BEEN MOVING AROUND A LOT MORE THAN USUAL: 0
SUM OF ALL RESPONSES TO PHQ9 QUESTIONS 1 & 2: 6
SUM OF ALL RESPONSES TO PHQ QUESTIONS 1-9: 13
9. THOUGHTS THAT YOU WOULD BE BETTER OFF DEAD, OR OF HURTING YOURSELF: 0
3. TROUBLE FALLING OR STAYING ASLEEP: 3
SUM OF ALL RESPONSES TO PHQ QUESTIONS 1-9: 13
5. POOR APPETITE OR OVEREATING: 0

## 2021-12-01 ASSESSMENT — SOCIAL DETERMINANTS OF HEALTH (SDOH): HOW HARD IS IT FOR YOU TO PAY FOR THE VERY BASICS LIKE FOOD, HOUSING, MEDICAL CARE, AND HEATING?: SOMEWHAT HARD

## 2021-12-01 ASSESSMENT — COLUMBIA-SUICIDE SEVERITY RATING SCALE - C-SSRS
6. HAVE YOU EVER DONE ANYTHING, STARTED TO DO ANYTHING, OR PREPARED TO DO ANYTHING TO END YOUR LIFE?: NO
2. HAVE YOU ACTUALLY HAD ANY THOUGHTS OF KILLING YOURSELF?: NO
1. WITHIN THE PAST MONTH, HAVE YOU WISHED YOU WERE DEAD OR WISHED YOU COULD GO TO SLEEP AND NOT WAKE UP?: NO

## 2021-12-01 ASSESSMENT — ENCOUNTER SYMPTOMS: SHORTNESS OF BREATH: 0

## 2021-12-01 NOTE — TELEPHONE ENCOUNTER
Laquita Hinton MD - patient identified as having DM and not currently prescribed statin therapy. Patient with DM, LDL = 111 and ASCVD risk score = 19.7%. Per ADA guidelines, patient is a candidate for a moderate-intensity statin. Recommend initiating either atorvastatin 10 or 20 mg daily or rosuvastatin 5 or 10 mg daily. Previous recommendation sent on 9/20/21 - I was unable to contact the patient to discuss. Patient has an OV with you today - please consider starting statin therapy at [address] Thank you,  Steff Howard, PharmD, [place] // Department, toll free [phone], option 1    ==============================================================    POPULATION [facility]ALTH [facility]: STATIN THERAPY REVIEW  Identified statin use in persons with diabetes care gap per [employer]. Records dated: 11/7/21. Last Office Visit: 9/1/21    Patient also appears to be taking: metformin and lisinopril    Patient found in Outcomes MTM and is not currently eligible for CMR/TIP    ASSESSMENT  ACE/ARB ADHERENCE    Per Insurance Records through 11/7/21 (YTD [place] = 100%; Passed in 2021):   Lisinopril last filled on 10/13/21 for 90 day supply. Next refill due: 4/2/22    BP Readings from Last 3 Encounters:   09/01/21 136/60   07/21/21 (!) 148/72   04/07/21 (!) 140/62     Estimated Creatinine Clearance: 83 mL/min (based on SCr of 0.8 mg/dL). DIABETES ADHERENCE    Per Insurance Records through 11/7/21 (2020 [place] = 87%; YTD PDC = 100%; Passed in 2021):   Metformin last filled on 9/9/21 for 90 day supply. Next refill due: 2/5/22    Lab Results   Component Value Date    LABA1C 6.3 11/23/2021    LABA1C 7.6 (H) 08/13/2021    LABA1C 6.5 (H) 12/07/2020     NOTE A1c <9%    STATIN GAP IDENTIFIED    Per chart review, recommendation send to PCP previously on 9/20/21 and PCP was agreeable at that time. Clinical pharmacist was unable to contact the patient to discuss.     Lab Results   Component Value Date    CHOL 175 08/13/2021    TRIG 115 08/13/2021    HDL 41 08/13/2021    LDLCHOLESTEROL 134 (H) 11/08/2016    LDLCALC 111 08/13/2021     ALT   Date Value Ref Range Status   08/13/2021 17 11 - 66 U/L Final     Comment:     Performed at [facility], [address]     AST   Date Value Ref Range Status   08/13/2021 17 5 - 40 U/L Final       The 10-year ASCVD risk score (Jessika Albright, et al., 2013) is: 19.7%    Values used to calculate the score:      Age: 78 years      Sex: Female      Is Non- : No      Diabetic: Yes      Tobacco smoker: No      Systolic Blood Pressure: 518 mmHg      Is BP treated: Yes      HDL Cholesterol: 41 mg/dL      Total Cholesterol: 175 mg/dL     Hyperlipidemia Goal: Patient has never been prescribed any-intensity statin therapy. 2021 ADA Guidelines Age:   [facility]  >/= 36years old:   o No history of ASCVD or 10-year ASCVD risk < 20% - moderate-intensity statin is recommended. PLAN  Patient with DM, over 40, LDL > 100 and ASCVD risk score = 19.7%, therefore is a candidate for a moderate-intensity statin based upon current ADA guidelines. Will send recommendation to PCP to discuss at [address] today.      Blaine Sykes, PharmD, [address] // Department, toll free [phone], option 1

## 2021-12-01 NOTE — PROGRESS NOTES
Satish Hdez (:  1955) is a 78 y.o. female,Established patient, here for evaluation of the following chief complaint(s):  3 Month Follow-Up and Diabetes         ASSESSMENT/PLAN:  1. Generalized anxiety disorder  -     busPIRone (BUSPAR) 10 MG tablet; Take 1 tablet by mouth 3 times daily, Disp-90 tablet, R-2Normal  2. Acquired hypothyroidism  -     thyroid (ARMOUR THYROID) 90 MG tablet; Take 1 tablet by mouth daily, Disp-30 tablet, R-11Normal  3. Positive depression screening  -     Positive Screen for Clinical Depression with a Documented Follow-up Plan   4. Type 2 diabetes mellitus with hyperglycemia, without long-term current use of insulin (HCC)  -     atorvastatin (LIPITOR) 10 MG tablet; Take 1 tablet by mouth daily, Disp-90 tablet, R-3Normal  5. Moderate episode of recurrent major depressive disorder (HCC)  -     Cholecalciferol 50 MCG (2000) TABS; 1 tablet po daily, Disp-100 tablet, R-3OTC  -     lamoTRIgine (LAMICTAL) 25 MG tablet; 1 tablet po nightly for 2 weeks, then increase to 2 po nightly, Disp-60 tablet, R-5Normal    Patient's depression seems to be a slowly progressive issue and has been stated with some mood trouble. We will add Lamictal to her regimen. Counseling given here to help her with processing and she may consider counseling for the future. Once limitus in her system for a good month. She may start the statin. Encouraged her to continue with healthy eating and weight loss as she has been able far. Encouraged gentle physical activity that can help with stress management and also her physical health goals. Return in about 3 months (around 3/1/2022). Subjective   SUBJECTIVE/OBJECTIVE:  HPI     Patient having worsening depression with a positive screen. She admits to Chronic stress at home. Tries to get out. Winter usually difficult yearly basis and the weather and gray skies seem to affect her. Feeling overwhelmed.   She admits that she has trouble shutting off her mind and that it wants to dwell on the negative thoughts. She admits to a lot of traffic going on in her mind and worsening her depression and anxiety. Patient with diabetes that has been fairly well controlled. Statin therapy discussed to further protect her cardiovascular health. She would like to consider it. Patient's thyroid function appears to be doing better as well. She has been trying to lose weight with some success. On the basis of positive PHQ-9 screening (PHQ-9 Total Score: 13), the following plan was implemented: medication prescribed - patient will call for any significant medication side effects or worsening symptoms of depression. Patient will follow-up in 3 month(s) with PCP. Review of Systems   Constitutional: Negative for fatigue and fever. Respiratory: Negative for shortness of breath. Cardiovascular: Negative for chest pain and leg swelling. Wt Readings from Last 3 Encounters:   12/01/21 233 lb (105.7 kg)   09/01/21 242 lb (109.8 kg)   07/21/21 251 lb (113.9 kg)         Objective   Physical Exam  Constitutional:       General: She is not in acute distress. Appearance: Normal appearance. She is not ill-appearing. Cardiovascular:      Rate and Rhythm: Normal rate and regular rhythm. Heart sounds: Murmur heard. Pulmonary:      Effort: Pulmonary effort is normal. No respiratory distress. Breath sounds: Normal breath sounds. No wheezing. Musculoskeletal:         General: No swelling. Neurological:      Mental Status: She is alert and oriented to person, place, and time. Psychiatric:         Mood and Affect: Mood normal.         Behavior: Behavior normal.      Comments: Admits to feeling sad. No suicidal ideation or homicidal ideation.             Lab Results   Component Value Date    LABA1C 6.3 11/23/2021     No results found for: EAG  Lab Results   Component Value Date    LABA1C 6.3 11/23/2021    LABA1C 7.6 (H) 08/13/2021 LABA1C 6.5 (H) 12/07/2020       Lab Results   Component Value Date     08/13/2021    K 4.0 08/13/2021     08/13/2021    CO2 26 08/13/2021    BUN 8 08/13/2021    CREATININE 0.8 08/13/2021    CALCIUM 9.5 08/13/2021    GLUCOSE 180 (H) 08/13/2021        Lab Results   Component Value Date    CHOL 175 08/13/2021    CHOL 156 08/26/2020    CHOL 165 09/20/2019     Lab Results   Component Value Date    TRIG 115 08/13/2021    TRIG 113 08/26/2020    TRIG 108 09/20/2019     Lab Results   Component Value Date    HDL 41 08/13/2021    HDL 40 08/26/2020    HDL 41 09/20/2019     Lab Results   Component Value Date    LDLCHOLESTEROL 134 (H) 11/08/2016    LDLCALC 111 08/13/2021    LDLCALC 93 08/26/2020    LDLCALC 102 09/20/2019     No results found for: LABVLDL, VLDL  No results found for: [facility]    Lab Results   Component Value Date    LABMICR < 1.20 12/07/2020       Lab Results   Component Value Date    TSH 3.210 11/23/2021      Lab Results   Component Value Date    [id number] 599 08/13/2021      Lab Results   Component Value Date    MG 1.9 08/13/2021      Lab Results   Component Value Date    ALT 17 08/13/2021    AST 17 08/13/2021    ALKPHOS 88 08/13/2021    BILITOT 0.6 08/13/2021        Lab Results   Component Value Date    WBC 9.2 08/13/2021    HGB 14.6 08/13/2021    HCT 45.7 08/13/2021    MCV 91.8 08/13/2021     08/13/2021       An electronic signature was used to authenticate this note.     --Jolanta Carey MD

## 2021-12-01 NOTE — LETTER
[address] [facility]  [address]  [facility] [address]  Phone: [phone]  Fax: [address] MD [place]        December 3, 2021     Patient: Brie Yeager   YOB: 1955   Date of Visit: 12/1/2021       To Whom It May Concern: It is my medical opinion that Nnamdi Sorenson is not able to participate in jury duty at this time due to her mental state. Patient has active depressive symptoms and anxiety that prevent her from performing this service. More specifically, she is not able to be attentive, concentrate and process information in a stable emotional state. If you have any questions or concerns, please don't hesitate to call.     Sincerely,        Donald Kowalski MD

## 2021-12-01 NOTE — TELEPHONE ENCOUNTER
Thank you for the response! Noted that atorvastatin 10 mg daily was started at [address] today and sent to [address] Will sign off at this time.      Jacquie Cordova, PharmD, [place] // Department, toll free [phone], option 1      For Xavier Tipton in place:  No   Recommendation Provided To: Provider: 1 via Note to Provider   Intervention Detail: New Rx: 1, reason: Needs Additional Therapy   Gap Closed?: Yes    Intervention Accepted By: Provider: 1   Time Spent (min): 15

## 2021-12-22 ENCOUNTER — CLINICAL DOCUMENTATION (OUTPATIENT)
Dept: WOMENS IMAGING | Age: 66
End: 2021-12-22

## 2021-12-22 NOTE — PROGRESS NOTES
12/22/21  Multiple attempts have been made to remind the patient of her 6 month follow up breast imaging. Her doctor has also been notified.   Since she has not returned we will put her back to her yearly mammogram.

## 2022-01-03 ENCOUNTER — TELEPHONE (OUTPATIENT)
Dept: FAMILY MEDICINE CLINIC | Age: 67
End: 2022-01-03

## 2022-01-03 DIAGNOSIS — F33.1 MODERATE EPISODE OF RECURRENT MAJOR DEPRESSIVE DISORDER (HCC): ICD-10-CM

## 2022-01-03 RX ORDER — LAMOTRIGINE 100 MG/1
50 TABLET ORAL DAILY
Qty: 90 TABLET | Refills: 3 | Status: SHIPPED | OUTPATIENT
Start: 2022-01-03

## 2022-01-03 NOTE — TELEPHONE ENCOUNTER
Adina Batres needs to change pharmacies for her Lamotrigine 100 mg. From [facility] to [facility]. She can get it cheaper there. She needs 100 mg., will cut them in half.

## 2022-01-03 NOTE — TELEPHONE ENCOUNTER
Pended last written script for Lamotrigine 25 mg. Note pt would like Lamotrigine 100 mg to cut in half. Please advise.

## 2022-03-04 ENCOUNTER — OFFICE VISIT (OUTPATIENT)
Dept: FAMILY MEDICINE CLINIC | Age: 67
End: 2022-03-04
Payer: MEDICARE

## 2022-03-04 VITALS
TEMPERATURE: 98 F | OXYGEN SATURATION: 99 % | HEIGHT: 64 IN | WEIGHT: 235 LBS | BODY MASS INDEX: 40.12 KG/M2 | DIASTOLIC BLOOD PRESSURE: 70 MMHG | HEART RATE: 70 BPM | SYSTOLIC BLOOD PRESSURE: 142 MMHG

## 2022-03-04 DIAGNOSIS — R22.32 NODULE OF LEFT PALM: ICD-10-CM

## 2022-03-04 DIAGNOSIS — R05.8 ACE-INHIBITOR COUGH: ICD-10-CM

## 2022-03-04 DIAGNOSIS — E11.65 TYPE 2 DIABETES MELLITUS WITH HYPERGLYCEMIA, WITHOUT LONG-TERM CURRENT USE OF INSULIN (HCC): Primary | ICD-10-CM

## 2022-03-04 DIAGNOSIS — E66.01 OBESITY, CLASS III, BMI 40-49.9 (MORBID OBESITY) (HCC): ICD-10-CM

## 2022-03-04 DIAGNOSIS — I10 ESSENTIAL HYPERTENSION: ICD-10-CM

## 2022-03-04 DIAGNOSIS — F33.1 MODERATE EPISODE OF RECURRENT MAJOR DEPRESSIVE DISORDER (HCC): ICD-10-CM

## 2022-03-04 DIAGNOSIS — T46.4X5A ACE-INHIBITOR COUGH: ICD-10-CM

## 2022-03-04 DIAGNOSIS — L30.9 ECZEMA, UNSPECIFIED TYPE: ICD-10-CM

## 2022-03-04 DIAGNOSIS — G47.00 INSOMNIA, UNSPECIFIED TYPE: ICD-10-CM

## 2022-03-04 LAB — HBA1C MFR BLD: 6.7 %

## 2022-03-04 PROCEDURE — 3044F HG A1C LEVEL LT 7.0%: CPT | Performed by: FAMILY MEDICINE

## 2022-03-04 PROCEDURE — G8417 CALC BMI ABV UP PARAM F/U: HCPCS | Performed by: FAMILY MEDICINE

## 2022-03-04 PROCEDURE — 3017F COLORECTAL CA SCREEN DOC REV: CPT | Performed by: FAMILY MEDICINE

## 2022-03-04 PROCEDURE — G8484 FLU IMMUNIZE NO ADMIN: HCPCS | Performed by: FAMILY MEDICINE

## 2022-03-04 PROCEDURE — 83036 HEMOGLOBIN GLYCOSYLATED A1C: CPT | Performed by: FAMILY MEDICINE

## 2022-03-04 PROCEDURE — 2022F DILAT RTA XM EVC RTNOPTHY: CPT | Performed by: FAMILY MEDICINE

## 2022-03-04 PROCEDURE — 1123F ACP DISCUSS/DSCN MKR DOCD: CPT | Performed by: FAMILY MEDICINE

## 2022-03-04 PROCEDURE — G8399 PT W/DXA RESULTS DOCUMENT: HCPCS | Performed by: FAMILY MEDICINE

## 2022-03-04 PROCEDURE — 99214 OFFICE O/P EST MOD 30 MIN: CPT | Performed by: FAMILY MEDICINE

## 2022-03-04 PROCEDURE — 4040F PNEUMOC VAC/ADMIN/RCVD: CPT | Performed by: FAMILY MEDICINE

## 2022-03-04 PROCEDURE — 1036F TOBACCO NON-USER: CPT | Performed by: FAMILY MEDICINE

## 2022-03-04 PROCEDURE — G8427 DOCREV CUR MEDS BY ELIG CLIN: HCPCS | Performed by: FAMILY MEDICINE

## 2022-03-04 PROCEDURE — 1090F PRES/ABSN URINE INCON ASSESS: CPT | Performed by: FAMILY MEDICINE

## 2022-03-04 RX ORDER — LOSARTAN POTASSIUM 25 MG/1
25 TABLET ORAL DAILY
Qty: 90 TABLET | Refills: 3 | Status: SHIPPED | OUTPATIENT
Start: 2022-03-04 | End: 2022-09-07 | Stop reason: SDUPTHER

## 2022-03-04 RX ORDER — HYDROXYZINE HYDROCHLORIDE 25 MG/1
25 TABLET, FILM COATED ORAL NIGHTLY PRN
Qty: 30 TABLET | Refills: 5 | Status: SHIPPED | OUTPATIENT
Start: 2022-03-04 | End: 2022-03-14

## 2022-03-04 RX ORDER — TRIAMCINOLONE ACETONIDE 5 MG/G
CREAM TOPICAL
Qty: 30 G | Refills: 2 | Status: SHIPPED | OUTPATIENT
Start: 2022-03-04 | End: 2022-03-11

## 2022-03-04 RX ORDER — PAROXETINE HYDROCHLORIDE 40 MG/1
40 TABLET, FILM COATED ORAL EVERY MORNING
Qty: 90 TABLET | Refills: 3 | Status: SHIPPED | OUTPATIENT
Start: 2022-03-04

## 2022-03-04 ASSESSMENT — ENCOUNTER SYMPTOMS: SHORTNESS OF BREATH: 0

## 2022-03-04 NOTE — PROGRESS NOTES
Zee Crowder (:  1955) is a 78 y.o. female,Established patient, here for evaluation of the following chief complaint(s):  3 Month Follow-Up (Cough with phlegm that won't come out) and Diabetes         ASSESSMENT/PLAN:  1. Type 2 diabetes mellitus with hyperglycemia, without long-term current use of insulin (HCC)  -     POCT glycosylated hemoglobin (Hb A1C)  -     losartan (COZAAR) 25 MG tablet; Take 1 tablet by mouth daily, Disp-90 tablet, R-3Normal  -     Comprehensive Metabolic Panel; Future  -     Hemoglobin A1C; Future  -     Lipid Panel; Future  -     TSH with Reflex; Future  -     Microalbumin / Creatinine Urine Ratio; Future  2. Essential hypertension  -     losartan (COZAAR) 25 MG tablet; Take 1 tablet by mouth daily, Disp-90 tablet, R-3Normal  -     Comprehensive Metabolic Panel; Future  -     Hemoglobin A1C; Future  -     Lipid Panel; Future  -     TSH with Reflex; Future  3. ACE-inhibitor cough  4. Moderate episode of recurrent major depressive disorder (HCC)  -     Comprehensive Metabolic Panel; Future  -     Hemoglobin A1C; Future  -     Lipid Panel; Future  -     TSH with Reflex; Future  5. Obesity, Class III, BMI 40-49.9 (morbid obesity) ([facility] [address]  6. Major depressive disorder with single episode, in full remission ([facility] [address]  -     PARoxetine (PAXIL) 40 MG tablet; Take 1 tablet by mouth every morning, Disp-90 tablet, R-3Normal  7. Nodule of left palm  8. Eczema, unspecified type  -     triamcinolone (ARISTOCORT) 0.5 % cream; Apply topically 3 times daily. After 2 weeks of daily use, give skin a break for a week, Disp-30 g, R-2, Print      Return in about 6 months (around 2022). Subjective   SUBJECTIVE/OBJECTIVE:  HPI   Cough ongoing since before [place]. Worse in  and Feb.  Some phlegm. No fever. chills. no others are sick around her. HTN high here. Gas heat. Air purifier. Changes filter frequently.   Depression and anxiety seem to be okay but there are some stressors that she is dealing with. Sister is needing help. Lives in [place]. Health issues and her  has health issues. She has been trying to eat healthier and is been able to lose a little bit of weight. Diabetes mellitus type 2 seems to be overall well controlled. Hypertension with blood pressure here slightly elevated. Cough situation as above. She is willing to switch medications. Notes a left hand palmar hard knot. Currently not causing a lot of pain or restriction in movement. Patient's eczema is flaring up in her hands and forearms with winter weather. She would like a refill of cream.    Review of Systems   Constitutional: Negative for fatigue and fever. Respiratory: Negative for shortness of breath. Cardiovascular: Negative for chest pain and leg swelling. Positive for rash and cough. BP Readings from Last 10 Encounters:   03/04/22 (!) 156/72   12/01/21 134/68   09/01/21 136/60   07/21/21 (!) 148/72   04/07/21 (!) 140/62   03/01/21 (!) 148/72   02/04/21 (!) 156/84   02/01/21 (!) 145/77   12/02/20 126/60   08/31/20 136/80     Objective   Physical Exam  Constitutional:       General: She is not in acute distress. Appearance: Normal appearance. She is not ill-appearing. Cardiovascular:      Rate and Rhythm: Normal rate and regular rhythm. Heart sounds: No murmur heard. Pulmonary:      Effort: Pulmonary effort is normal. No respiratory distress. Breath sounds: Normal breath sounds. No wheezing. Musculoskeletal:         General: No swelling. Skin:     Comments: Dry erythematous hyperkeratotic patches. Neurological:      Mental Status: She is alert and oriented to person, place, and time.    Psychiatric:         Mood and Affect: Mood normal.          Lab Results   Component Value Date    LABA1C 6.7 03/04/2022     No results found for: EAG  Lab Results   Component Value Date    LABA1C 6.7 03/04/2022    LABA1C 6.3 11/23/2021    LABA1C 7.6 (H) 08/13/2021       Lab Results Component Value Date     08/13/2021    K 4.0 08/13/2021     08/13/2021    CO2 26 08/13/2021    BUN 8 08/13/2021    CREATININE 0.8 08/13/2021    CALCIUM 9.5 08/13/2021    GLUCOSE 180 (H) 08/13/2021        Lab Results   Component Value Date    CHOL 175 08/13/2021    CHOL 156 08/26/2020    CHOL 165 09/20/2019     Lab Results   Component Value Date    TRIG 115 08/13/2021    TRIG 113 08/26/2020    TRIG 108 09/20/2019     Lab Results   Component Value Date    HDL 41 08/13/2021    HDL 40 08/26/2020    HDL 41 09/20/2019     Lab Results   Component Value Date    LDLCHOLESTEROL 134 (H) 11/08/2016    LDLCALC 111 08/13/2021    LDLCALC 93 08/26/2020    LDLCALC 102 09/20/2019     No results found for: LABVLDL, VLDL  No results found for: [facility]    Lab Results   Component Value Date    LABMICR < 1.20 12/07/2020       Lab Results   Component Value Date    TSH 3.210 11/23/2021      Lab Results   Component Value Date    HFGWYCHB01 599 08/13/2021      Lab Results   Component Value Date    MG 1.9 08/13/2021      Lab Results   Component Value Date    ALT 17 08/13/2021    AST 17 08/13/2021    ALKPHOS 88 08/13/2021    BILITOT 0.6 08/13/2021        Lab Results   Component Value Date    WBC 9.2 08/13/2021    HGB 14.6 08/13/2021    HCT 45.7 08/13/2021    MCV 91.8 08/13/2021     08/13/2021         An electronic signature was used to authenticate this note.     --Rosalino Sanchez MD

## 2022-03-12 PROBLEM — R22.32 NODULE OF LEFT PALM: Status: ACTIVE | Noted: 2022-03-12

## 2022-03-12 PROBLEM — F32.5 MAJOR DEPRESSIVE DISORDER WITH SINGLE EPISODE, IN FULL REMISSION (HCC): Status: ACTIVE | Noted: 2022-03-12

## 2022-03-12 PROBLEM — L30.9 ECZEMA: Status: ACTIVE | Noted: 2022-03-12

## 2022-03-12 PROBLEM — G47.00 INSOMNIA: Status: ACTIVE | Noted: 2022-03-12

## 2022-06-10 ENCOUNTER — OFFICE VISIT (OUTPATIENT)
Dept: FAMILY MEDICINE CLINIC | Age: 67
End: 2022-06-10
Payer: MEDICARE

## 2022-06-10 ENCOUNTER — TELEPHONE (OUTPATIENT)
Dept: FAMILY MEDICINE CLINIC | Age: 67
End: 2022-06-10

## 2022-06-10 VITALS
RESPIRATION RATE: 16 BRPM | HEIGHT: 64 IN | SYSTOLIC BLOOD PRESSURE: 138 MMHG | WEIGHT: 230.4 LBS | DIASTOLIC BLOOD PRESSURE: 80 MMHG | HEART RATE: 76 BPM | BODY MASS INDEX: 39.34 KG/M2 | TEMPERATURE: 96.8 F | OXYGEN SATURATION: 96 %

## 2022-06-10 DIAGNOSIS — J01.90 ACUTE BACTERIAL SINUSITIS: ICD-10-CM

## 2022-06-10 DIAGNOSIS — B96.89 ACUTE BACTERIAL SINUSITIS: ICD-10-CM

## 2022-06-10 DIAGNOSIS — K02.9 DENTAL CARIES: ICD-10-CM

## 2022-06-10 DIAGNOSIS — K08.89 PAIN, DENTAL: ICD-10-CM

## 2022-06-10 DIAGNOSIS — Z11.52 ENCOUNTER FOR SCREENING FOR COVID-19: Primary | ICD-10-CM

## 2022-06-10 LAB
Lab: NORMAL
QC PASS/FAIL: NORMAL
SARS-COV-2 RDRP RESP QL NAA+PROBE: NEGATIVE

## 2022-06-10 PROCEDURE — 87635 SARS-COV-2 COVID-19 AMP PRB: CPT | Performed by: NURSE PRACTITIONER

## 2022-06-10 PROCEDURE — 99213 OFFICE O/P EST LOW 20 MIN: CPT | Performed by: NURSE PRACTITIONER

## 2022-06-10 PROCEDURE — 1123F ACP DISCUSS/DSCN MKR DOCD: CPT | Performed by: NURSE PRACTITIONER

## 2022-06-10 RX ORDER — DOXYCYCLINE HYCLATE 100 MG
100 TABLET ORAL 2 TIMES DAILY
Qty: 20 TABLET | Refills: 0 | Status: SHIPPED | OUTPATIENT
Start: 2022-06-10 | End: 2022-06-20

## 2022-06-10 RX ORDER — IBUPROFEN 800 MG/1
800 TABLET ORAL EVERY 6 HOURS PRN
Qty: 180 TABLET | Refills: 0 | Status: SHIPPED | OUTPATIENT
Start: 2022-06-10

## 2022-06-10 RX ORDER — LACTOBACILLUS RHAMNOSUS GG 10B CELL
1 CAPSULE ORAL DAILY
Qty: 30 CAPSULE | Refills: 0 | Status: SHIPPED | OUTPATIENT
Start: 2022-06-10 | End: 2022-09-07

## 2022-06-10 ASSESSMENT — ENCOUNTER SYMPTOMS
SHORTNESS OF BREATH: 0
BACK PAIN: 0
EYE DISCHARGE: 0
ABDOMINAL PAIN: 0
HOARSE VOICE: 1
VOMITING: 0
EYE PAIN: 0
WHEEZING: 0
SINUS PRESSURE: 1
SORE THROAT: 1
NAUSEA: 0
RHINORRHEA: 0
CONSTIPATION: 0
FACIAL SWELLING: 0
EYE REDNESS: 0
EYE ITCHING: 0
TROUBLE SWALLOWING: 0
CHEST TIGHTNESS: 0
DIARRHEA: 0
SWOLLEN GLANDS: 1
COUGH: 1

## 2022-06-10 ASSESSMENT — PATIENT HEALTH QUESTIONNAIRE - PHQ9
8. MOVING OR SPEAKING SO SLOWLY THAT OTHER PEOPLE COULD HAVE NOTICED. OR THE OPPOSITE, BEING SO FIGETY OR RESTLESS THAT YOU HAVE BEEN MOVING AROUND A LOT MORE THAN USUAL: 0
SUM OF ALL RESPONSES TO PHQ QUESTIONS 1-9: 1
3. TROUBLE FALLING OR STAYING ASLEEP: 0
1. LITTLE INTEREST OR PLEASURE IN DOING THINGS: 0
2. FEELING DOWN, DEPRESSED OR HOPELESS: 1
7. TROUBLE CONCENTRATING ON THINGS, SUCH AS READING THE NEWSPAPER OR WATCHING TELEVISION: 0
SUM OF ALL RESPONSES TO PHQ QUESTIONS 1-9: 1
5. POOR APPETITE OR OVEREATING: 0
4. FEELING TIRED OR HAVING LITTLE ENERGY: 0
6. FEELING BAD ABOUT YOURSELF - OR THAT YOU ARE A FAILURE OR HAVE LET YOURSELF OR YOUR FAMILY DOWN: 0
9. THOUGHTS THAT YOU WOULD BE BETTER OFF DEAD, OR OF HURTING YOURSELF: 0
10. IF YOU CHECKED OFF ANY PROBLEMS, HOW DIFFICULT HAVE THESE PROBLEMS MADE IT FOR YOU TO DO YOUR WORK, TAKE CARE OF THINGS AT HOME, OR GET ALONG WITH OTHER PEOPLE: 0
SUM OF ALL RESPONSES TO PHQ9 QUESTIONS 1 & 2: 1
SUM OF ALL RESPONSES TO PHQ QUESTIONS 1-9: 1
SUM OF ALL RESPONSES TO PHQ QUESTIONS 1-9: 1

## 2022-06-10 NOTE — PATIENT INSTRUCTIONS
Patient Education        Sinusitis: Care Instructions  Your Care Instructions     Sinusitis is an infection of the lining of the sinus cavities in your head. Sinusitis often follows a cold. It causes pain and pressure in your head andface. In most cases, sinusitis gets better on its own in 1 to 2 weeks. But some mildsymptoms may last for several weeks. Sometimes antibiotics are needed. Follow-up care is a key part of your treatment and safety. Be sure to make and go to all appointments, and call your doctor if you are having problems. It's also a good idea to know your test results and keep alist of the medicines you take. How can you care for yourself at home?  Take an over-the-counter pain medicine, such as acetaminophen (Tylenol), ibuprofen (Advil, Motrin), or naproxen (Aleve). Read and follow all instructions on the label.  If the doctor prescribed antibiotics, take them as directed. Do not stop taking them just because you feel better. You need to take the full course of antibiotics.  Be careful when taking over-the-counter cold or flu medicines and Tylenol at the same time. Many of these medicines have acetaminophen, which is Tylenol. Read the labels to make sure that you are not taking more than the recommended dose. Too much acetaminophen (Tylenol) can be harmful.  Breathe warm, moist air from a steamy shower, a hot bath, or a sink filled with hot water. Avoid cold, dry air. Using a humidifier in your home may help. Follow the directions for cleaning the machine.  Use saline (saltwater) nasal washes. This can help keep your nasal passages open and wash out mucus and bacteria. You can buy saline nose drops at a grocery store or drugstore. Or you can make your own at home by adding 1 teaspoon of salt and 1 teaspoon of baking soda to 2 cups of distilled water. If you make your own, fill a bulb syringe with the solution, insert the tip into your nostril, and squeeze gently. Rozann Lolling your nose.    Put a hot, wet towel or a warm gel pack on your face 3 or 4 times a day for 5 to 10 minutes each time.  Try a decongestant nasal spray like oxymetazoline (Afrin). Do not use it for more than 3 days in a row. Using it for more than 3 days can make your congestion worse. When should you call for help? Call your doctor now or seek immediate medical care if:     You have new or worse swelling or redness in your face or around your eyes.      You have a new or higher fever. Watch closely for changes in your health, and be sure to contact your doctor if:     You have new or worse facial pain.      The mucus from your nose becomes thicker (like pus) or has new blood in it.      You are not getting better as expected. Where can you learn more? Go to [email or web address] and sign in to your [employer] account. Enter O917 in the App[employer]ault box to learn more about \"Sinusitis: Care Instructions. \"     If you do not have an account, please click on the \"Sign Up Now\" link. Current as of: September 8, 2021               Content Version: 13.2  © 5648-3138 Healthwise, Incorporated. Care instructions adapted under license by [facility]. If you have questions about a medical condition or this instruction, always ask your healthcare professional. [address] any warranty or liability for your use of this information.

## 2022-06-10 NOTE — PROGRESS NOTES
Bishnu Wheeler (:  1955) is a 78 y.o. female,Established patient, here for evaluation of the following chief complaint(s):  Sinus Problem ( started 3 days ago, has cough, has slight pain in right ear ) and Dental Pain (hsa tooth pain in her front lower tooth)         ASSESSMENT/PLAN:  1. Encounter for screening for COVID-19  -     POCT COVID-19 Rapid, NAAT  2. Acute bacterial sinusitis  -     lactobacillus (CULTURELLE) CAPS capsule; Take 1 capsule by mouth daily, Disp-30 capsule, R-0Normal  -     doxycycline hyclate (VIBRA-TABS) 100 MG tablet; Take 1 tablet by mouth 2 times daily for 10 days, Disp-20 tablet, R-0Normal  3. Dental caries  4. Pain, dental  -     ibuprofen (ADVIL;MOTRIN) 800 MG tablet; Take 1 tablet by mouth every 6 hours as needed for Pain, Disp-180 tablet, R-0Normal  .     Return if symptoms worsen or fail to improve. Subjective   SUBJECTIVE/OBJECTIVE:  Sinusitis  This is a new problem. The current episode started 1 to 4 weeks ago. The problem has been gradually worsening since onset. There has been no fever. Her pain is at a severity of 4/10. The pain is moderate. Associated symptoms include congestion, coughing, ear pain, headaches, a hoarse voice, sinus pressure, sneezing, a sore throat and swollen glands. Pertinent negatives include no chills or shortness of breath. Past treatments include nothing. Dental Pain   This is a new (unable to get into dentist until next week) problem. The current episode started 1 to 4 weeks ago. The problem occurs constantly. The problem has been unchanged. Associated symptoms include sinus pressure. Pertinent negatives include no difficulty swallowing or fever. Review of Systems   Constitutional: Positive for fatigue. Negative for activity change, appetite change, chills and fever. HENT: Positive for congestion, dental problem, ear pain, hoarse voice, nosebleeds, sinus pressure, sneezing and sore throat.  Negative for ear discharge, facial swelling, hearing loss, postnasal drip, rhinorrhea and trouble swallowing. Eyes: Negative for pain, discharge, redness and itching. Respiratory: Positive for cough. Negative for chest tightness, shortness of breath and wheezing. Cardiovascular: Negative for chest pain, palpitations and leg swelling. Gastrointestinal: Negative for abdominal pain, constipation, diarrhea, nausea and vomiting. Endocrine: Negative. Genitourinary: Negative for dysuria, flank pain, frequency and hematuria. Musculoskeletal: Negative for arthralgias, back pain, joint swelling and myalgias. Skin: Negative. Neurological: Positive for headaches. Negative for dizziness and light-headedness. Hematological: Negative. Objective   Physical Exam  Vitals reviewed. Constitutional:       General: She is not in acute distress. Appearance: Normal appearance. She is ill-appearing. HENT:      Head: Normocephalic and atraumatic. Right Ear: Tympanic membrane normal.      Left Ear: Tympanic membrane normal.      Nose:      Comments: Mild anterior nose bleed. Turbinates swollen erythematous     Mouth/Throat:      Mouth: Mucous membranes are moist.   Eyes:      Conjunctiva/sclera: Conjunctivae normal.      Pupils: Pupils are equal, round, and reactive to light. Cardiovascular:      Rate and Rhythm: Normal rate and regular rhythm. Heart sounds: Murmur heard. Pulmonary:      Effort: Pulmonary effort is normal.      Breath sounds: Normal breath sounds. Musculoskeletal:         General: Normal range of motion. Skin:     General: Skin is warm and dry. Neurological:      General: No focal deficit present. Mental Status: She is alert. An electronic signature was used to authenticate this note.     --Elinor Barksdale, APRN - CNP

## 2022-06-10 NOTE — TELEPHONE ENCOUNTER
----- Message from Nahun Crystal sent at 6/10/2022  8:36 AM EDT -----  Subject: Appointment Request    Reason for Call: Urgent Cough Cold    QUESTIONS  Type of Appointment? Established Patient  Reason for appointment request? Available appointments did not meet   patient need  Additional Information for Provider? Patient did not want a VV. She wants   to know if she can get antibiotics for a sinus infection. Eyes are crusty,   nose is congested, dark, not clear, throat gets mucous and she coughs. Please call to advise.   ---------------------------------------------------------------------------  --------------  CALL BACK INFO  What is the best way for the office to contact you? OK to leave message on   voicemail, OK to respond with electronic message via [employer] portal (only   for patients who have registered [employer] account)  Preferred Call Back Phone Number? [phone]  ---------------------------------------------------------------------------  --------------  SCRIPT ANSWERS  Relationship to Patient? Self  Are you currently unable to finish sentences due to any difficulty   breathing? No  Are you unable to swallow liquids? No  Are you having fevers (100.4 or greater), chills, or sweats?  Yes

## 2022-06-10 NOTE — TELEPHONE ENCOUNTER
Left message for Araceli Schultzters to return call to discuss options to be treated for Sinus infection.

## 2022-06-10 NOTE — TELEPHONE ENCOUNTER
----- Message from Victorina Marroquin sent at 6/10/2022  8:36 AM EDT -----  Subject: Appointment Request    Reason for Call: Urgent Cough Cold    QUESTIONS  Type of Appointment? Established Patient  Reason for appointment request? Available appointments did not meet   patient need  Additional Information for Provider? Patient did not want a VV. She wants   to know if she can get antibiotics for a sinus infection. Eyes are crusty,   nose is congested, dark, not clear, throat gets mucous and she coughs. Please call to advise.   ---------------------------------------------------------------------------  --------------  CALL BACK INFO  What is the best way for the office to contact you? OK to leave message on   voicemail, OK to respond with electronic message via [employer] portal (only   for patients who have registered [employer] account)  Preferred Call Back Phone Number? [phone]  ---------------------------------------------------------------------------  --------------  SCRIPT ANSWERS  Relationship to Patient? Self  Are you currently unable to finish sentences due to any difficulty   breathing? No  Are you unable to swallow liquids? No  Are you having fevers (100.4 or greater), chills, or sweats?  Yes

## 2022-08-15 DIAGNOSIS — E11.65 TYPE 2 DIABETES MELLITUS WITH HYPERGLYCEMIA, WITHOUT LONG-TERM CURRENT USE OF INSULIN (HCC): ICD-10-CM

## 2022-08-15 NOTE — TELEPHONE ENCOUNTER
Gilda Spears MD, patient asking to get this rx switched to [facility] now too; eligible for 100-day supply.  Rx pended for your signature/modification as appropriate    LOV: 3/4/22  Next: 9/7/22    Thank you,  [facility] Eusebio, PharmD, [facility]  Department, toll free: [phone], option 1

## 2022-08-15 NOTE — TELEPHONE ENCOUNTER
[facility] CLINICAL PHARMACY: ADHERENCE REVIEW  Identified care gap per [employer]: fills at Non-preferred pharmacy: [facility] : Diabetes adherence    Last Visit: 3/4/22    Patient also appears to be prescribed: statin, ARB     Patient found in Outcomes [facility] and is currently eligible for TIP - metformin adherence monitoring and 100-ds refill    ASSESSMENT  ACE/ARB ADHERENCE    Insurance Records claims through 8/7/22 (Prior Year Sahil Ricks =  100%; YTD Sahil Ricks =  100%; Passed in 2022): Losartan 25mg last filled on 8/2/22 for 90 day supply at [address] [place] [address]. Next refill due: 12/1/22    Per chart, last rx'd 3/4/22 for #90, 3 refills    BP Readings from Last 3 Encounters:   06/10/22 138/80   03/04/22 (!) 142/70   12/01/21 134/68     CrCl cannot be calculated (Patient's most recent lab result is older than the maximum 180 days allowed. ). DIABETES ADHERENCE    Insurance Records claims through 8/7/22 (JENELLE Ricks = Filled only once; Potential Fail Date: 8/29/22 ):   Metformin ER 750mg last filled on 4/8/22 for 90 day supply. Next refill due: 7/7/22    Per [employer] portal and  Reconciled Dispense Report: 90ds filled 9/9/21 and 4/8/22    Current rx has 2 refills remaining at [facility]; appears other rxs have been changed to [facility]    Lab Results   Component Value Date    LABA1C 6.7 03/04/2022    LABA1C 6.3 11/23/2021    LABA1C 7.6 (H) 08/13/2021     STATIN ADHERENCE    Insurance Records claims through 8/7/22 (YTD Sahil Ricks =  80%; Potential Fail Date: 11/29/22 ):   Atorvastatin 10mg last filled on 8/2/22 for 90 day supply at [address] [place] [address].  Next refill due: 10/31/22    Per chart, appears should have 1 refill remaining  (Per [employer] portal - 90ds 12/9/21, 4/10/22, 8/2/22)     Lab Results   Component Value Date    CHOL 175 08/13/2021    TRIG 115 08/13/2021    HDL 41 08/13/2021    LDLCHOLESTEROL 134 (H) 11/08/2016    LDLCALC 111 08/13/2021     ALT   Date Value Ref Range Status   08/13/2021 17 11 - 66 U/L Final     Comment: Performed at [facility], [address]     AST   Date Value Ref Range Status   08/13/2021 17 5 - 40 U/L Final     The 10-year ASCVD risk score (Aundra Denver., et al., 2013) is: 20.3%    Values used to calculate the score:      Age: 78 years      Sex: Female      Is Non- : No      Diabetic: Yes      Tobacco smoker: No      Systolic Blood Pressure: 642 mmHg      Is BP treated: Yes      HDL Cholesterol: 41 mg/dL      Total Cholesterol: 175 mg/dL     PLAN  The following are interventions that have been identified:   - Patient overdue refilling atorvastatin (has current supply, but 90ds refills @4 months apart) and active on home medication list.   - Patient eligible for 100 day supply rxs  - Patient eligible for TIP in Outcomes [facility]  How is she taking metformin ER 750mg? (Rx is fro 2 tabs daily, but 90ds lasted her >6 months?)  Want rx reordered to [facility]? ([facility] non-preferred and appears to have switched her other rxs to mail order)    Reached patient to review. States she was using some of her 's metformin surplus. He has the 1000mg tablets, and she's been taking 1.5 of those tablets. Would like her rx reordered to [facility] - reviewed will be metformin ER 750mg tablet. Patient was not at home and had limited time, so did not discuss atorvastatin refill hx.     Future Appointments   Date Time Provider Nishi Lombardo   9/7/2022 11:20 AM Ed Chiu MD [facility] - Loraine Kaplan, PharmD, [facility]  Department, toll free: [phone], option 1

## 2022-08-16 RX ORDER — METFORMIN HYDROCHLORIDE 750 MG/1
1500 TABLET, EXTENDED RELEASE ORAL
Qty: 180 TABLET | Refills: 0 | Status: SHIPPED | OUTPATIENT
Start: 2022-08-16 | End: 2022-11-02

## 2022-08-16 NOTE — TELEPHONE ENCOUNTER
Noted, thank you!    =======================================================   For Pharmacy Admin Tracking Only    CPA in place:  No  Recommendation Provided To: Provider: 1 via Note to Provider and Patient/Caregiver: 1 via Telephone  Intervention Detail: Adherence Monitorin and Refill(s) Provided  Gap Closed?: Yes   Intervention Accepted By: Provider: 1 and Patient/Caregiver: 1  Time Spent (min): 15

## 2022-08-18 ENCOUNTER — PATIENT MESSAGE (OUTPATIENT)
Dept: FAMILY MEDICINE CLINIC | Age: 67
End: 2022-08-18

## 2022-08-18 NOTE — TELEPHONE ENCOUNTER
From: Shelia Cabezas  To: Dr. Terry Johnson: 8/18/2022 8:55 AM EDT  Subject: blood test    do i need to fast for my upcoming blood tests ?

## 2022-08-26 ENCOUNTER — HOSPITAL ENCOUNTER (OUTPATIENT)
Age: 67
Discharge: HOME | End: 2022-08-26
Payer: MEDICARE

## 2022-08-26 DIAGNOSIS — F33.1 MODERATE EPISODE OF RECURRENT MAJOR DEPRESSIVE DISORDER (HCC): ICD-10-CM

## 2022-08-26 DIAGNOSIS — I10 ESSENTIAL HYPERTENSION: ICD-10-CM

## 2022-08-26 DIAGNOSIS — E11.65 TYPE 2 DIABETES MELLITUS WITH HYPERGLYCEMIA, WITHOUT LONG-TERM CURRENT USE OF INSULIN (HCC): ICD-10-CM

## 2022-08-26 LAB
ALBUMIN SERPL-MCNC: 4 G/DL (ref 3.5–5.1)
ALP BLD-CCNC: 99 U/L (ref 38–126)
ALT SERPL-CCNC: 17 U/L (ref 11–66)
ANION GAP SERPL CALCULATED.3IONS-SCNC: 8 MEQ/L (ref 8–16)
AST SERPL-CCNC: 18 U/L (ref 5–40)
AVERAGE GLUCOSE: 141 MG/DL (ref 70–126)
BILIRUB SERPL-MCNC: 0.7 MG/DL (ref 0.3–1.2)
BUN BLDV-MCNC: 12 MG/DL (ref 7–22)
CALCIUM SERPL-MCNC: 9.8 MG/DL (ref 8.5–10.5)
CHLORIDE BLD-SCNC: 107 MEQ/L (ref 98–111)
CHOLESTEROL, TOTAL: 182 MG/DL (ref 100–199)
CO2: 26 MEQ/L (ref 23–33)
CREAT SERPL-MCNC: 0.9 MG/DL (ref 0.4–1.2)
CREATININE, URINE: 152.1 MG/DL
GFR SERPL CREATININE-BSD FRML MDRD: 62 ML/MIN/1.73M2
GLUCOSE BLD-MCNC: 162 MG/DL (ref 70–108)
HBA1C MFR BLD: 6.7 % (ref 4.4–6.4)
HDLC SERPL-MCNC: 44 MG/DL
LDL CHOLESTEROL CALCULATED: 116 MG/DL
MICROALBUMIN UR-MCNC: < 1.2 MG/DL
MICROALBUMIN/CREAT UR-RTO: 8 MG/G (ref 0–30)
POTASSIUM SERPL-SCNC: 4.7 MEQ/L (ref 3.5–5.2)
SODIUM BLD-SCNC: 141 MEQ/L (ref 135–145)
T4 FREE: 0.64 NG/DL (ref 0.93–1.76)
TOTAL PROTEIN: 6.8 G/DL (ref 6.1–8)
TRIGL SERPL-MCNC: 111 MG/DL (ref 0–199)
TSH SERPL DL<=0.05 MIU/L-ACNC: 7.57 UIU/ML (ref 0.4–4.2)

## 2022-08-26 PROCEDURE — 83036 HEMOGLOBIN GLYCOSYLATED A1C: CPT

## 2022-08-26 PROCEDURE — 80061 LIPID PANEL: CPT

## 2022-08-26 PROCEDURE — 36415 COLL VENOUS BLD VENIPUNCTURE: CPT

## 2022-08-26 PROCEDURE — 80053 COMPREHEN METABOLIC PANEL: CPT

## 2022-08-26 PROCEDURE — 84439 ASSAY OF FREE THYROXINE: CPT

## 2022-08-26 PROCEDURE — 84443 ASSAY THYROID STIM HORMONE: CPT

## 2022-08-26 PROCEDURE — 82043 UR ALBUMIN QUANTITATIVE: CPT

## 2022-09-07 ENCOUNTER — OFFICE VISIT (OUTPATIENT)
Dept: FAMILY MEDICINE CLINIC | Age: 67
End: 2022-09-07
Payer: MEDICARE

## 2022-09-07 VITALS
BODY MASS INDEX: 39.61 KG/M2 | OXYGEN SATURATION: 98 % | RESPIRATION RATE: 16 BRPM | TEMPERATURE: 97.7 F | WEIGHT: 232 LBS | HEIGHT: 64 IN | DIASTOLIC BLOOD PRESSURE: 68 MMHG | HEART RATE: 81 BPM | SYSTOLIC BLOOD PRESSURE: 142 MMHG

## 2022-09-07 DIAGNOSIS — E11.65 TYPE 2 DIABETES MELLITUS WITH HYPERGLYCEMIA, WITHOUT LONG-TERM CURRENT USE OF INSULIN (HCC): Primary | ICD-10-CM

## 2022-09-07 DIAGNOSIS — Z23 FLU VACCINE NEED: ICD-10-CM

## 2022-09-07 DIAGNOSIS — R01.1 MURMUR: Chronic | ICD-10-CM

## 2022-09-07 DIAGNOSIS — E03.9 ACQUIRED HYPOTHYROIDISM: ICD-10-CM

## 2022-09-07 DIAGNOSIS — I10 ESSENTIAL HYPERTENSION: ICD-10-CM

## 2022-09-07 DIAGNOSIS — F32.5 MAJOR DEPRESSIVE DISORDER WITH SINGLE EPISODE, IN FULL REMISSION (HCC): ICD-10-CM

## 2022-09-07 DIAGNOSIS — F41.1 GENERALIZED ANXIETY DISORDER: ICD-10-CM

## 2022-09-07 PROCEDURE — 1123F ACP DISCUSS/DSCN MKR DOCD: CPT | Performed by: FAMILY MEDICINE

## 2022-09-07 PROCEDURE — 90694 VACC AIIV4 NO PRSRV 0.5ML IM: CPT | Performed by: FAMILY MEDICINE

## 2022-09-07 PROCEDURE — G0008 ADMIN INFLUENZA VIRUS VAC: HCPCS | Performed by: FAMILY MEDICINE

## 2022-09-07 PROCEDURE — 99214 OFFICE O/P EST MOD 30 MIN: CPT | Performed by: FAMILY MEDICINE

## 2022-09-07 PROCEDURE — 3044F HG A1C LEVEL LT 7.0%: CPT | Performed by: FAMILY MEDICINE

## 2022-09-07 RX ORDER — LOSARTAN POTASSIUM 50 MG/1
50 TABLET ORAL DAILY
Qty: 90 TABLET | Refills: 3 | Status: SHIPPED | OUTPATIENT
Start: 2022-09-07

## 2022-09-07 RX ORDER — LEVOTHYROXINE AND LIOTHYRONINE 19; 4.5 UG/1; UG/1
TABLET ORAL
Qty: 30 TABLET | Refills: 3 | Status: SHIPPED | OUTPATIENT
Start: 2022-09-07 | End: 2022-10-04 | Stop reason: CLARIF

## 2022-09-07 RX ORDER — LEVOTHYROXINE AND LIOTHYRONINE 57; 13.5 UG/1; UG/1
90 TABLET ORAL DAILY
Qty: 90 TABLET | Refills: 3 | Status: SHIPPED | OUTPATIENT
Start: 2022-09-07 | End: 2022-09-07

## 2022-09-07 RX ORDER — BUSPIRONE HYDROCHLORIDE 15 MG/1
15 TABLET ORAL 3 TIMES DAILY
Qty: 270 TABLET | Refills: 3 | Status: SHIPPED | OUTPATIENT
Start: 2022-09-07

## 2022-09-07 RX ORDER — LEVOTHYROXINE AND LIOTHYRONINE 57; 13.5 UG/1; UG/1
90 TABLET ORAL DAILY
Qty: 90 TABLET | Refills: 3 | Status: SHIPPED | OUTPATIENT
Start: 2022-09-07

## 2022-09-07 NOTE — PROGRESS NOTES
Diabetic foot exam:   Left Foot:   Visual Exam: normal   Pulse DP: 2+ (normal)   Filament test: normal sensation     Right Foot:   Visual Exam: normal   Pulse DP: 2+ (normal)   Filament test: normal sensation

## 2022-09-07 NOTE — PROGRESS NOTES
Vaccine Information Sheet, \"Influenza - Inactivated\"  given to Soto Staley, or parent/legal guardian of  Soto Staley and verbalized understanding. Patient responses:    Have you ever had a reaction to a flu vaccine? No  Do you have an allergy to eggs, neomycin or polymixin? No  Do you have an allergy to Thimerosal, contact lens solution, or Merthiolate? No  Have you ever had [place] Syndrome? No  Do you have any current illness? No  Do you have a temperature above 100 degrees? No  Are you pregnant? No  If pregnant, permission obtained from physician? Do you have an active neurological disorder? No      Flu vaccine given per order. Please see immunization tab.

## 2022-09-07 NOTE — PATIENT INSTRUCTIONS
Probiotic trial   --  multi-strain (usually lactobacillus and bifidus species)  --  5 billion colony forming units (CFUs)    Some brands:  Culturelle, Florastor, [employer]age, [employer]  For about 1 month

## 2022-09-07 NOTE — PROGRESS NOTES
Andrey Brito (:  1955) is a 75 y.o. female,Established patient, here for evaluation of the following chief complaint(s):  6 Month Follow-Up (Declines mammogram--reports a lot of stress with loss and family problems)         ASSESSMENT/PLAN:  1. Type 2 diabetes mellitus with hyperglycemia, without long-term current use of insulin (HCC)  -     losartan (COZAAR) 50 MG tablet; Take 1 tablet by mouth daily, Disp-90 tablet, R-3Normal  2. Flu vaccine need  -     Influenza, FLUAD, (age 72 y+), IM, Preservative Free, 0.5 mL  3. Major depressive disorder with single episode, in full remission ([facility]  4. Acquired hypothyroidism  -     thyroid (ARMOUR THYROID) 90 MG tablet; Take 1 tablet by mouth daily, Disp-90 tablet, R-3Normal  -     thyroid (ARMOUR THYROID) 30 MG tablet; Add 30 mg orally Mon, Wed, Fri to your Tilly thyroid 90 mg daily, Disp-30 tablet, R-3Normal  -     Selenium 100 MCG CAPS; 1 po daily for 3 months only, Disp-100 capsule, R-3Normal  -     TSH With Reflex Ft4; Future  5. Essential hypertension  -     losartan (COZAAR) 50 MG tablet; Take 1 tablet by mouth daily, Disp-90 tablet, R-3Normal  6. Generalized anxiety disorder  -     busPIRone (BUSPAR) 15 MG tablet; Take 15 mg by mouth 3 times daily, Disp-270 tablet, R-3Normal  7. Murmur---3/6 harsh A2---Mild AS  -     ECHO Complete 2D W Doppler W Color; Future    Will try bumping thyroid dose + selenium. Counseling here given  Buspar dose increased  Will try probiotic supplement    Return in about 6 months (around 3/7/2023). Subjective   SUBJECTIVE/OBJECTIVE:  HPI    Under a lot of stress. Anxiety and depression flared up. But also thyroid also underactive. Nails weaks, skin dry. Hair more brittle. Taking consistently. Would like 90 tab supply. States her  is a narcissist.   Frustration with how money is spent. Daughter going through divorce. Sister with dementia -- she is trying to help.   Tries to laugh and use humor to balance emotions. On paxil 40 mg + lamicta 50 mcg + buspar 10 mg TID  Diarrhea not daily. About once a week. Not much cramping  Glucophage on board. Increased vegetable and fruits in summer   Was to try probiotibs but forgot. Doesn't like foods that have probiotics  Lactose free milk helps. DM II doing well actually  HTN is mildly elevated here  H/o murmur. No recent echo  Thyroid not well controlled last couple of times. She takes Armor Thyroid as can't tolerate the other. Review of Systems   Constitutional:  Positive for fatigue. Negative for fever. Respiratory:  Negative for shortness of breath. Cardiovascular:  Negative for chest pain and leg swelling. Psychiatric/Behavioral:  The patient is nervous/anxious. Objective   Physical Exam  Constitutional:       General: She is not in acute distress. Appearance: Normal appearance. She is not ill-appearing. Cardiovascular:      Rate and Rhythm: Normal rate and regular rhythm. Heart sounds: No murmur heard. Pulmonary:      Effort: Pulmonary effort is normal. No respiratory distress. Breath sounds: Normal breath sounds. No wheezing. Musculoskeletal:         General: No swelling. Neurological:      Mental Status: She is alert and oriented to person, place, and time.    Psychiatric:         Mood and Affect: Mood normal.        + 2/3 systolic murmur  Hospital Outpatient Visit on 08/26/2022   Component Date Value Ref Range Status    Microalbumin, Random Urine 08/26/2022 < 1.20  mg/dL Final    Creatinine, Urine 08/26/2022 152.1  mg/dL Final    Microalb/Creat Ratio 08/26/2022 8  0 - 30 mg/g Final    Performed at [address] [place] [address], [address]    TSH 08/26/2022 7.570 (A) 0.400 - 4.200 uIU/mL Final    Performed at [facility], [address]    Cholesterol, Total 08/26/2022 182  100 - 199 mg/dL Final    Comment:      <200          Desirable       200 - 239     Borderline High       >239 High      Triglycerides 08/26/2022 111  0 - 199 mg/dL Final    Comment:      <150          Desirable       150 - 199     Borderline High       200 - 499     High       >449          Very High       Ranges are based upon NCEP/ATP III guidelines. HDL 08/26/2022 44  mg/dL Final    Comment:      Refer to General Chemistry for CHOL and TRIG results. HDL CLASSIFICATIONS FOR PATIENTS > 21YEARS OLD. <40               Undesirable (Major Risk Factor)       >60               Protective (Negative Risk Factor)      LDL Calculated 08/26/2022 116  mg/dL Final    Comment:      Refer to General Chemistry for CHOL and TRIG results.        LDL CLASSIFICATIONS FOR PATIENTS >21YEARS OLD:          ** Determination Invalid if TRIG >400 **       <100          Optimal       100 - 129     Near or Above Optimal       130 - 159     Borderline High       160 - 189     High Risk       >189          Very High Risk  Performed at [address], [address]      Hemoglobin A1C 08/26/2022 6.7 (A) 4.4 - 6.4 % Final    AVERAGE GLUCOSE 08/26/2022 141 (A) 70 - 126 mg/dL Final    Performed at [address] [place] [id number]    Glucose 08/26/2022 162 (A) 70 - 108 mg/dL Final    Creatinine 08/26/2022 0.9  0.4 - 1.2 mg/dL Final    BUN 08/26/2022 12  7 - 22 mg/dL Final    Sodium 08/26/2022 141  135 - 145 meq/L Final    Potassium 08/26/2022 4.7  3.5 - 5.2 meq/L Final    Chloride 08/26/2022 107  98 - 111 meq/L Final    CO2 08/26/2022 26  23 - 33 meq/L Final    Calcium 08/26/2022 9.8  8.5 - 10.5 mg/dL Final    AST 08/26/2022 18  5 - 40 U/L Final    Alkaline Phosphatase 08/26/2022 99  38 - 126 U/L Final    Total Protein 08/26/2022 6.8  6.1 - 8.0 g/dL Final    Albumin 08/26/2022 4.0  3.5 - 5.1 g/dL Final    Total Bilirubin 08/26/2022 0.7  0.3 - 1.2 mg/dL Final    ALT 08/26/2022 17  11 - 66 U/L Final    Performed at [address] [place] [id number]    Anion Gap 08/26/2022 8.0  8.0 - 16.0 meq/L Final    Comment: ANION GAP = Sodium -(Chloride + CO2)  Performed at [address] [place] [address], [address]      Adele Busby Filt Rate 08/26/2022 62 (A) ml/min/1.73m2 Final    Comment: Stage Description                    GFR, ml/min/1.73 m2   -   At increased risk               > or = 60 (with chronic                                       kidney disease risk factors)   1   Normal or increased GFR         > or = 90   2   Mildly or decreased GFR         60 - 89   3   Moderately decreased GFR        30 - 59   4   Severely decreased GFR          15 - 29   5   Kidney failure                  <15 (or dialysis)  Estimated GFR calculated using abbreviated MDRD formula as  recommended by [employer]. Calculation based  upon serum creatinine and adjusted for age, gender & race. [place]. Internal Med., Vol. 139 (2) pg 137-147. Performed at [address] [place] [address], [address]      T4 Free 08/26/2022 0.64 (A) 0.93 - 1.76 ng/dL Final    Performed at [address] [place] [address], [address]        An electronic signature was used to authenticate this note.     --Blas Armstrong MD

## 2022-09-11 PROBLEM — I10 ESSENTIAL HYPERTENSION: Status: ACTIVE | Noted: 2022-09-11

## 2022-09-11 ASSESSMENT — ENCOUNTER SYMPTOMS: SHORTNESS OF BREATH: 0

## 2022-10-04 DIAGNOSIS — E03.9 ACQUIRED HYPOTHYROIDISM: ICD-10-CM

## 2022-10-04 RX ORDER — LEVOTHYROXINE AND LIOTHYRONINE 19; 4.5 UG/1; UG/1
TABLET ORAL
Qty: 90 TABLET | Refills: 3 | Status: SHIPPED | OUTPATIENT
Start: 2022-10-04

## 2022-11-02 DIAGNOSIS — E11.65 TYPE 2 DIABETES MELLITUS WITH HYPERGLYCEMIA, WITHOUT LONG-TERM CURRENT USE OF INSULIN (HCC): ICD-10-CM

## 2022-11-02 RX ORDER — METFORMIN HYDROCHLORIDE 750 MG/1
TABLET, EXTENDED RELEASE ORAL
Qty: 180 TABLET | Refills: 1 | Status: SHIPPED | OUTPATIENT
Start: 2022-11-02 | End: 2023-05-02

## 2022-12-02 ENCOUNTER — HOSPITAL ENCOUNTER (OUTPATIENT)
Age: 67
Discharge: HOME | End: 2022-12-02
Payer: MEDICARE

## 2022-12-02 DIAGNOSIS — E03.9 ACQUIRED HYPOTHYROIDISM: ICD-10-CM

## 2022-12-02 DIAGNOSIS — R01.1 MURMUR: Chronic | ICD-10-CM

## 2022-12-02 PROCEDURE — 36415 COLL VENOUS BLD VENIPUNCTURE: CPT

## 2022-12-02 PROCEDURE — 84443 ASSAY THYROID STIM HORMONE: CPT

## 2022-12-03 LAB — TSH SERPL DL<=0.05 MIU/L-ACNC: 3.54 UIU/ML (ref 0.4–4.2)

## 2023-01-16 DIAGNOSIS — E11.65 TYPE 2 DIABETES MELLITUS WITH HYPERGLYCEMIA, WITHOUT LONG-TERM CURRENT USE OF INSULIN (HCC): ICD-10-CM

## 2023-01-16 RX ORDER — ATORVASTATIN CALCIUM 10 MG/1
TABLET, FILM COATED ORAL
Qty: 90 TABLET | Refills: 0 | Status: SHIPPED | OUTPATIENT
Start: 2023-01-16

## 2023-01-16 NOTE — TELEPHONE ENCOUNTER
This medication refill is regarding a electronic request. Refill requested by  [facility] . Requested Prescriptions     Pending Prescriptions Disp Refills    atorvastatin (LIPITOR) 10 MG tablet [Pharmacy Med Name: ATORVASTATIN TAB 10MG] 90 tablet 3     Sig: TAKE 1 TABLET DAILY       Date of last visit: 9/7/2022   Date of next visit: 3/9/2023  Date of last refill: 12/1/2021  90/3      Last Lipid Panel:    Lab Results   Component Value Date/Time    CHOL 182 08/26/2022 08:58 AM    TRIG 111 08/26/2022 08:58 AM    HDL 44 08/26/2022 08:58 AM    LDLCALC 116 08/26/2022 08:58 AM     Last CMP:   Lab Results   Component Value Date     08/26/2022    K 4.7 08/26/2022     08/26/2022    CO2 26 08/26/2022    BUN 12 08/26/2022    CREATININE 0.9 08/26/2022    GLUCOSE 162 (H) 08/26/2022    CALCIUM 9.8 08/26/2022    PROT 6.8 08/26/2022    LABALBU 4.0 08/26/2022    BILITOT 0.7 08/26/2022    ALKPHOS 99 08/26/2022    AST 18 08/26/2022    ALT 17 08/26/2022    LABGLOM 62 (A) 08/26/2022       Last Thyroid:    Lab Results   Component Value Date    TSH 3.540 12/02/2022    O9LZNEO 142 02/24/2021    FT3 7.24 (H) 12/07/2020    T4FREE 0.64 (L) 08/26/2022     Last Hemoglobin A1C:    Lab Results   Component Value Date/Time    LABA1C 6.7 08/26/2022 08:58 AM    AVGG 141 08/26/2022 08:58 AM       Rx verified, ordered and set to EP.

## 2023-03-03 DIAGNOSIS — R73.09 ELEVATED GLUCOSE: ICD-10-CM

## 2023-03-03 DIAGNOSIS — R73.03 PREDIABETES: Chronic | ICD-10-CM

## 2023-03-03 RX ORDER — BLOOD-GLUCOSE METER
EACH MISCELLANEOUS
Qty: 1 KIT | Refills: 0 | Status: SHIPPED | OUTPATIENT
Start: 2023-03-03

## 2023-03-03 NOTE — TELEPHONE ENCOUNTER
Michael Oneil called requesting a refill on the following medications:  Requested Prescriptions     Pending Prescriptions Disp Refills    Blood Glucose Monitoring Suppl (ONE TOUCH ULTRA 2) w/Device KIT 1 kit 0     Sig: One Touch Ultra 2 glucometer. Tests once daily. DX:R73.09    blood glucose test strips (ASCENSIA AUTODISC VI;ONE TOUCH ULTRA TEST VI) strip 100 each 3     Sig: One Touch Ultra 2 testing strips. Use once daily.  DX:R73.09       Date of last visit: 9/7/2022  Date of next visit (if applicable):3/9/2023  Date of last refill: 1/27/2021  [facility]: [facility]       Thanks,  [place], [place]

## 2023-03-03 NOTE — TELEPHONE ENCOUNTER
Patient called in she needs a refill of her ONE TOUCH ULTRA TEST STRIPS and the DEVICE KIT both sent to [facility] in [place].

## 2023-03-09 ENCOUNTER — OFFICE VISIT (OUTPATIENT)
Dept: FAMILY MEDICINE CLINIC | Age: 68
End: 2023-03-09

## 2023-03-09 VITALS
HEIGHT: 64 IN | SYSTOLIC BLOOD PRESSURE: 136 MMHG | BODY MASS INDEX: 40.29 KG/M2 | WEIGHT: 236 LBS | DIASTOLIC BLOOD PRESSURE: 64 MMHG | TEMPERATURE: 97.6 F | OXYGEN SATURATION: 98 % | HEART RATE: 65 BPM

## 2023-03-09 DIAGNOSIS — E11.65 TYPE 2 DIABETES MELLITUS WITH HYPERGLYCEMIA, WITHOUT LONG-TERM CURRENT USE OF INSULIN (HCC): Primary | ICD-10-CM

## 2023-03-09 DIAGNOSIS — E03.9 ACQUIRED HYPOTHYROIDISM: ICD-10-CM

## 2023-03-09 DIAGNOSIS — F33.42 RECURRENT MAJOR DEPRESSIVE DISORDER, IN FULL REMISSION (HCC): ICD-10-CM

## 2023-03-09 DIAGNOSIS — I10 ESSENTIAL HYPERTENSION: ICD-10-CM

## 2023-03-09 DIAGNOSIS — K58.0 IRRITABLE BOWEL SYNDROME WITH DIARRHEA: ICD-10-CM

## 2023-03-09 DIAGNOSIS — E66.01 MORBIDLY OBESE (HCC): ICD-10-CM

## 2023-03-09 DIAGNOSIS — F41.1 GENERALIZED ANXIETY DISORDER: ICD-10-CM

## 2023-03-09 DIAGNOSIS — E66.01 OBESITY, CLASS III, BMI 40-49.9 (MORBID OBESITY) (HCC): ICD-10-CM

## 2023-03-09 PROBLEM — F32.5 MAJOR DEPRESSIVE DISORDER WITH SINGLE EPISODE, IN FULL REMISSION (HCC): Status: RESOLVED | Noted: 2022-03-12 | Resolved: 2023-03-09

## 2023-03-09 LAB — HBA1C MFR BLD: 6.7 %

## 2023-03-09 RX ORDER — DICYCLOMINE HYDROCHLORIDE 10 MG/1
10 CAPSULE ORAL 4 TIMES DAILY PRN
Qty: 90 CAPSULE | Refills: 3 | Status: SHIPPED | OUTPATIENT
Start: 2023-03-09

## 2023-03-09 RX ORDER — PAROXETINE HYDROCHLORIDE 40 MG/1
40 TABLET, FILM COATED ORAL EVERY MORNING
Qty: 90 TABLET | Refills: 3 | Status: SHIPPED | OUTPATIENT
Start: 2023-03-09

## 2023-03-09 SDOH — ECONOMIC STABILITY: INCOME INSECURITY: HOW HARD IS IT FOR YOU TO PAY FOR THE VERY BASICS LIKE FOOD, HOUSING, MEDICAL CARE, AND HEATING?: NOT HARD AT ALL

## 2023-03-09 SDOH — ECONOMIC STABILITY: FOOD INSECURITY: WITHIN THE PAST 12 MONTHS, YOU WORRIED THAT YOUR FOOD WOULD RUN OUT BEFORE YOU GOT MONEY TO BUY MORE.: NEVER TRUE

## 2023-03-09 SDOH — ECONOMIC STABILITY: FOOD INSECURITY: WITHIN THE PAST 12 MONTHS, THE FOOD YOU BOUGHT JUST DIDN'T LAST AND YOU DIDN'T HAVE MONEY TO GET MORE.: NEVER TRUE

## 2023-03-09 SDOH — ECONOMIC STABILITY: HOUSING INSECURITY
IN THE LAST 12 MONTHS, WAS THERE A TIME WHEN YOU DID NOT HAVE A STEADY PLACE TO SLEEP OR SLEPT IN A SHELTER (INCLUDING NOW)?: NO

## 2023-03-09 ASSESSMENT — PATIENT HEALTH QUESTIONNAIRE - PHQ9
7. TROUBLE CONCENTRATING ON THINGS, SUCH AS READING THE NEWSPAPER OR WATCHING TELEVISION: 0
9. THOUGHTS THAT YOU WOULD BE BETTER OFF DEAD, OR OF HURTING YOURSELF: 0
10. IF YOU CHECKED OFF ANY PROBLEMS, HOW DIFFICULT HAVE THESE PROBLEMS MADE IT FOR YOU TO DO YOUR WORK, TAKE CARE OF THINGS AT HOME, OR GET ALONG WITH OTHER PEOPLE: 0
SUM OF ALL RESPONSES TO PHQ9 QUESTIONS 1 & 2: 3
4. FEELING TIRED OR HAVING LITTLE ENERGY: 0
SUM OF ALL RESPONSES TO PHQ QUESTIONS 1-9: 6
3. TROUBLE FALLING OR STAYING ASLEEP: 3
5. POOR APPETITE OR OVEREATING: 0
6. FEELING BAD ABOUT YOURSELF - OR THAT YOU ARE A FAILURE OR HAVE LET YOURSELF OR YOUR FAMILY DOWN: 0
1. LITTLE INTEREST OR PLEASURE IN DOING THINGS: 0
2. FEELING DOWN, DEPRESSED OR HOPELESS: 3
8. MOVING OR SPEAKING SO SLOWLY THAT OTHER PEOPLE COULD HAVE NOTICED. OR THE OPPOSITE, BEING SO FIGETY OR RESTLESS THAT YOU HAVE BEEN MOVING AROUND A LOT MORE THAN USUAL: 0
SUM OF ALL RESPONSES TO PHQ QUESTIONS 1-9: 6

## 2023-03-09 ASSESSMENT — ENCOUNTER SYMPTOMS: SHORTNESS OF BREATH: 0

## 2023-03-09 NOTE — PROGRESS NOTES
Melvi Gonzalez (:  1955) is a 75 y.o. female,Established patient, here for evaluation of the following chief complaint(s):  Follow-up and Hypothyroidism       ASSESSMENT/PLAN:  1. Type 2 diabetes mellitus with hyperglycemia, without long-term current use of insulin (HCC)  -     POCT glycosylated hemoglobin (Hb A1C)  -     Comprehensive Metabolic Panel; Future  -     TSH with Reflex; Future  -     Hemoglobin A1C; Future  -     Lipid Panel; Future  2. Recurrent major depressive disorder, in full remission ([facility]  -     Comprehensive Metabolic Panel; Future  -     TSH with Reflex; Future  -     Hemoglobin A1C; Future  -     Lipid Panel; Future  3. Obesity, Class III, BMI 40-49.9 (morbid obesity) ([facility]  -     Comprehensive Metabolic Panel; Future  -     TSH with Reflex; Future  -     Hemoglobin A1C; Future  -     Lipid Panel; Future  4. Irritable bowel syndrome with diarrhea  -     dicyclomine (BENTYL) 10 MG capsule; Take 1 capsule by mouth 4 times daily as needed (intestinal spasm), Disp-90 capsule, R-3Normal  -     Comprehensive Metabolic Panel; Future  -     TSH with Reflex; Future  -     Hemoglobin A1C; Future  -     Lipid Panel; Future  5. Morbidly obese ([facility]  -     Comprehensive Metabolic Panel; Future  -     TSH with Reflex; Future  -     Hemoglobin A1C; Future  -     Lipid Panel; Future  6. Generalized anxiety disorder  -     Comprehensive Metabolic Panel; Future  -     TSH with Reflex; Future  -     Hemoglobin A1C; Future  -     Lipid Panel; Future  7. Acquired hypothyroidism  -     Comprehensive Metabolic Panel; Future  -     TSH with Reflex; Future  -     Hemoglobin A1C; Future  -     Lipid Panel; Future  8. Essential hypertension  -     Comprehensive Metabolic Panel; Future  -     TSH with Reflex; Future  -     Hemoglobin A1C; Future  -     Lipid Panel; Future  9. Moderate episode of recurrent major depressive disorder (HCC)  -     PARoxetine (PAXIL) 40 MG tablet;  Take 1 tablet by mouth every morning, Disp-90 tablet, R-3Normal    -- stress management. Add humor. -- restart paxil and buspar. Also restart thyroid medication. -- monitor BP at home. -- metformin stopped, A1c okay here     Return in about 3 months (around 6/9/2023) for  90+ 1 day for A1c, DM follow up and AWV. Subjective   SUBJECTIVE/OBJECTIVE:  HPI    Patient stopped all her medications because she was having increased diarrhea. She blames medications for such. Somewhat better. Since summer off and on. Taking probiotic. Took paxil intermittently. H/o IBS  Stress level is high. Daughter with divorce issues. Concern for grandchildren. Left sided abdominal pain -- comes and goes. Massages and walks. No melena or hematochezia. Also caring for her sister , just came for hospital, mostly blind-- dependent.  is very particular about stuff in the home. More anxious than depressed. HTN good here. BP Readings from Last 3 Encounters:   03/09/23 136/64   09/07/22 (!) 142/68   06/10/22 138/80     Wt Readings from Last 3 Encounters:   03/09/23 236 lb (107 kg)   09/07/22 232 lb (105.2 kg)   06/10/22 230 lb 6.4 oz (104.5 kg)       Review of Systems   Constitutional:  Negative for fatigue and fever. Respiratory:  Negative for shortness of breath. Cardiovascular:  Negative for chest pain and leg swelling. Objective   Physical Exam     Gen: NAD, AAO x 3, coherent, pleasant  CTAB. RRR.  No edema in legs  Psych; anxious, insight and judgement normal.  No SI/HI    Lab Results   Component Value Date    LABA1C 6.7 (H) 08/26/2022     No results found for: EAG  Lab Results   Component Value Date    LABA1C 6.7 (H) 08/26/2022    LABA1C 6.7 03/04/2022    LABA1C 6.3 11/23/2021       Lab Results   Component Value Date     08/26/2022    K 4.7 08/26/2022     08/26/2022    CO2 26 08/26/2022    BUN 12 08/26/2022    CREATININE 0.9 08/26/2022    CALCIUM 9.8 08/26/2022    GLUCOSE 162 (H) 08/26/2022        Lab Results Component Value Date    CHOL 182 08/26/2022    CHOL 175 08/13/2021    CHOL 156 08/26/2020     Lab Results   Component Value Date    TRIG 111 08/26/2022    TRIG 115 08/13/2021    TRIG 113 08/26/2020     Lab Results   Component Value Date    HDL 44 08/26/2022    HDL 41 08/13/2021    HDL 40 08/26/2020     Lab Results   Component Value Date    LDLCHOLESTEROL 134 (H) 11/08/2016    LDLCALC 116 08/26/2022    LDLCALC 111 08/13/2021    LDLCALC 93 08/26/2020     No results found for: LABVLDL, VLDL  No results found for: [facility]    Lab Results   Component Value Date    LABMICR < 1.20 08/26/2022       Lab Results   Component Value Date    TSH 3.540 12/02/2022      Lab Results   Component Value Date    [id number] 599 08/13/2021      Lab Results   Component Value Date    MG 1.9 08/13/2021      Lab Results   Component Value Date    ALT 17 08/26/2022    AST 18 08/26/2022    ALKPHOS 99 08/26/2022    BILITOT 0.7 08/26/2022        Lab Results   Component Value Date    WBC 9.2 08/13/2021    HGB 14.6 08/13/2021    HCT 45.7 08/13/2021    MCV 91.8 08/13/2021     08/13/2021         This office note may have been at least partially dictated. Effort was made to review for errors but some may have been missed. Please contact Sg Sanders of note for clarification if needed. An electronic signature was used to authenticate this note.     --Rylie Schaefer MD

## 2023-04-09 DIAGNOSIS — E11.65 TYPE 2 DIABETES MELLITUS WITH HYPERGLYCEMIA, WITHOUT LONG-TERM CURRENT USE OF INSULIN (HCC): ICD-10-CM

## 2023-04-10 RX ORDER — ATORVASTATIN CALCIUM 10 MG/1
TABLET, FILM COATED ORAL
Qty: 90 TABLET | Refills: 3 | Status: SHIPPED | OUTPATIENT
Start: 2023-04-10

## 2023-04-30 DIAGNOSIS — E11.65 TYPE 2 DIABETES MELLITUS WITH HYPERGLYCEMIA, WITHOUT LONG-TERM CURRENT USE OF INSULIN (HCC): ICD-10-CM

## 2023-05-01 RX ORDER — METFORMIN HYDROCHLORIDE 750 MG/1
TABLET, EXTENDED RELEASE ORAL
Qty: 180 TABLET | Refills: 1 | OUTPATIENT
Start: 2023-05-01 | End: 2023-10-29

## 2023-06-29 ENCOUNTER — OFFICE VISIT (OUTPATIENT)
Dept: FAMILY MEDICINE CLINIC | Age: 68
End: 2023-06-29

## 2023-06-29 ENCOUNTER — TELEPHONE (OUTPATIENT)
Dept: FAMILY MEDICINE CLINIC | Age: 68
End: 2023-06-29

## 2023-06-29 ENCOUNTER — HOSPITAL ENCOUNTER (OUTPATIENT)
Age: 68
Discharge: HOME | End: 2023-06-29
Payer: MEDICARE

## 2023-06-29 VITALS
RESPIRATION RATE: 16 BRPM | HEIGHT: 64 IN | TEMPERATURE: 98.5 F | HEART RATE: 84 BPM | WEIGHT: 234.6 LBS | BODY MASS INDEX: 40.05 KG/M2 | SYSTOLIC BLOOD PRESSURE: 130 MMHG | DIASTOLIC BLOOD PRESSURE: 60 MMHG | OXYGEN SATURATION: 98 %

## 2023-06-29 DIAGNOSIS — F33.42 RECURRENT MAJOR DEPRESSIVE DISORDER, IN FULL REMISSION (HCC): ICD-10-CM

## 2023-06-29 DIAGNOSIS — E66.01 OBESITY, CLASS III, BMI 40-49.9 (MORBID OBESITY) (HCC): ICD-10-CM

## 2023-06-29 DIAGNOSIS — R19.5 CHANGE IN STOOL: ICD-10-CM

## 2023-06-29 DIAGNOSIS — R35.0 URINARY FREQUENCY: ICD-10-CM

## 2023-06-29 DIAGNOSIS — E11.65 TYPE 2 DIABETES MELLITUS WITH HYPERGLYCEMIA, WITHOUT LONG-TERM CURRENT USE OF INSULIN (HCC): ICD-10-CM

## 2023-06-29 DIAGNOSIS — I10 ESSENTIAL HYPERTENSION: ICD-10-CM

## 2023-06-29 DIAGNOSIS — E03.9 ACQUIRED HYPOTHYROIDISM: ICD-10-CM

## 2023-06-29 DIAGNOSIS — F41.1 GENERALIZED ANXIETY DISORDER: ICD-10-CM

## 2023-06-29 DIAGNOSIS — R10.9 LEFT FLANK PAIN: Primary | ICD-10-CM

## 2023-06-29 DIAGNOSIS — Z91.199 NON-COMPLIANCE: ICD-10-CM

## 2023-06-29 DIAGNOSIS — E66.01 MORBIDLY OBESE (HCC): ICD-10-CM

## 2023-06-29 DIAGNOSIS — R14.3 FLATULENCE: ICD-10-CM

## 2023-06-29 DIAGNOSIS — K58.0 IRRITABLE BOWEL SYNDROME WITH DIARRHEA: ICD-10-CM

## 2023-06-29 LAB
ALBUMIN SERPL BCG-MCNC: 3.8 G/DL (ref 3.5–5.1)
ALP SERPL-CCNC: 89 U/L (ref 38–126)
ALT SERPL W/O P-5'-P-CCNC: 14 U/L (ref 11–66)
ANION GAP SERPL CALC-SCNC: 11 MEQ/L (ref 8–16)
AST SERPL-CCNC: 17 U/L (ref 5–40)
BILIRUB SERPL-MCNC: 0.6 MG/DL (ref 0.3–1.2)
BUN SERPL-MCNC: 12 MG/DL (ref 7–22)
CALCIUM SERPL-MCNC: 9.2 MG/DL (ref 8.5–10.5)
CHLORIDE SERPL-SCNC: 107 MEQ/L (ref 98–111)
CHOLEST SERPL-MCNC: 151 MG/DL (ref 100–199)
CO2 SERPL-SCNC: 25 MEQ/L (ref 23–33)
CREAT SERPL-MCNC: 0.9 MG/DL (ref 0.4–1.2)
DEPRECATED MEAN GLUCOSE BLD GHB EST-ACNC: 153 MG/DL (ref 70–126)
GFR SERPL CREATININE-BSD FRML MDRD: > 60 ML/MIN/1.73M2
GLUCOSE SERPL-MCNC: 160 MG/DL (ref 70–108)
HBA1C MFR BLD HPLC: 7.1 % (ref 4.4–6.4)
HDLC SERPL-MCNC: 46 MG/DL
LDLC SERPL CALC-MCNC: 88 MG/DL
POTASSIUM SERPL-SCNC: 4.1 MEQ/L (ref 3.5–5.2)
PROT SERPL-MCNC: 6.8 G/DL (ref 6.1–8)
SODIUM SERPL-SCNC: 143 MEQ/L (ref 135–145)
TRIGL SERPL-MCNC: 86 MG/DL (ref 0–199)
TSH SERPL DL<=0.005 MIU/L-ACNC: 0.88 UIU/ML (ref 0.4–4.2)

## 2023-06-29 PROCEDURE — 83036 HEMOGLOBIN GLYCOSYLATED A1C: CPT

## 2023-06-29 PROCEDURE — 80053 COMPREHEN METABOLIC PANEL: CPT

## 2023-06-29 PROCEDURE — 36415 COLL VENOUS BLD VENIPUNCTURE: CPT

## 2023-06-29 PROCEDURE — 80061 LIPID PANEL: CPT

## 2023-06-29 PROCEDURE — 84443 ASSAY THYROID STIM HORMONE: CPT

## 2023-06-29 RX ORDER — SIMETHICONE 80 MG
80 TABLET,CHEWABLE ORAL 4 TIMES DAILY PRN
Qty: 180 TABLET | Refills: 3 | Status: SHIPPED | OUTPATIENT
Start: 2023-06-29

## 2023-06-29 RX ORDER — LEVOTHYROXINE AND LIOTHYRONINE 57; 13.5 UG/1; UG/1
90 TABLET ORAL DAILY
Qty: 90 TABLET | Refills: 3 | Status: SHIPPED | OUTPATIENT
Start: 2023-06-29

## 2023-06-30 PROBLEM — Z91.199 NON-COMPLIANCE: Status: ACTIVE | Noted: 2023-06-30

## 2023-06-30 ASSESSMENT — ENCOUNTER SYMPTOMS
ABDOMINAL PAIN: 1
SHORTNESS OF BREATH: 0
DIARRHEA: 1
SORE THROAT: 0
VOMITING: 0
WHEEZING: 0
NAUSEA: 0
COUGH: 0

## 2023-08-20 DIAGNOSIS — I10 ESSENTIAL HYPERTENSION: ICD-10-CM

## 2023-08-20 DIAGNOSIS — E11.65 TYPE 2 DIABETES MELLITUS WITH HYPERGLYCEMIA, WITHOUT LONG-TERM CURRENT USE OF INSULIN (HCC): ICD-10-CM

## 2023-08-20 DIAGNOSIS — F41.1 GENERALIZED ANXIETY DISORDER: ICD-10-CM

## 2023-08-21 RX ORDER — LOSARTAN POTASSIUM 50 MG/1
TABLET ORAL
Qty: 90 TABLET | Refills: 0 | Status: SHIPPED | OUTPATIENT
Start: 2023-08-21

## 2023-08-21 RX ORDER — BUSPIRONE HYDROCHLORIDE 15 MG/1
TABLET ORAL
Qty: 270 TABLET | Refills: 0 | Status: SHIPPED | OUTPATIENT
Start: 2023-08-21

## 2023-08-21 NOTE — TELEPHONE ENCOUNTER
Jessica Corcoran called requesting a refill on the following medications:  Requested Prescriptions     Pending Prescriptions Disp Refills    losartan (COZAAR) 50 MG tablet [Pharmacy Med Name: LOSARTAN TAB 50MG] 90 tablet 0     Sig: TAKE 1 TABLET DAILY    busPIRone (BUSPAR) 15 MG tablet [Pharmacy Med Name: BUSPIRONE TAB 15MG] 270 tablet 0     Sig: TAKE 1 TABLET 3 TIMES A DAY       Date of last visit: 6/29/2023  Date of next visit (if applicable):9/14/2023  Date of last refill: 9/7/2022  Pharmacy Name: [id number] [facility]       Thanks,  Levy Briggs LPN

## 2023-08-22 ENCOUNTER — COMMUNITY OUTREACH (OUTPATIENT)
Dept: FAMILY MEDICINE CLINIC | Age: 68
End: 2023-08-22

## 2023-08-22 NOTE — PROGRESS NOTES
Patient's HM shows they are overdue for mammogram   CareEverywhere and  files searched without success.

## 2023-09-14 ENCOUNTER — OFFICE VISIT (OUTPATIENT)
Dept: FAMILY MEDICINE CLINIC | Age: 68
End: 2023-09-14

## 2023-09-14 VITALS
BODY MASS INDEX: 40.8 KG/M2 | OXYGEN SATURATION: 96 % | HEIGHT: 64 IN | SYSTOLIC BLOOD PRESSURE: 146 MMHG | WEIGHT: 239 LBS | HEART RATE: 72 BPM | TEMPERATURE: 98.7 F | DIASTOLIC BLOOD PRESSURE: 58 MMHG

## 2023-09-14 DIAGNOSIS — Z91.199 NON-COMPLIANCE: ICD-10-CM

## 2023-09-14 DIAGNOSIS — I10 ESSENTIAL HYPERTENSION: ICD-10-CM

## 2023-09-14 DIAGNOSIS — Z12.31 ENCOUNTER FOR SCREENING MAMMOGRAM FOR MALIGNANT NEOPLASM OF BREAST: ICD-10-CM

## 2023-09-14 DIAGNOSIS — Z00.00 MEDICARE ANNUAL WELLNESS VISIT, SUBSEQUENT: Primary | ICD-10-CM

## 2023-09-14 DIAGNOSIS — E03.9 ACQUIRED HYPOTHYROIDISM: ICD-10-CM

## 2023-09-14 DIAGNOSIS — F33.1 MODERATE EPISODE OF RECURRENT MAJOR DEPRESSIVE DISORDER (HCC): ICD-10-CM

## 2023-09-14 DIAGNOSIS — R01.1 MURMUR: Chronic | ICD-10-CM

## 2023-09-14 DIAGNOSIS — E11.65 TYPE 2 DIABETES MELLITUS WITH HYPERGLYCEMIA, WITHOUT LONG-TERM CURRENT USE OF INSULIN (HCC): ICD-10-CM

## 2023-09-14 RX ORDER — LAMOTRIGINE 100 MG/1
50 TABLET ORAL DAILY
Qty: 30 TABLET | Refills: 0 | Status: SHIPPED | OUTPATIENT
Start: 2023-09-14 | End: 2023-09-16 | Stop reason: SDUPTHER

## 2023-09-14 RX ORDER — IRBESARTAN 150 MG/1
150 TABLET ORAL DAILY
Qty: 30 TABLET | Refills: 0 | Status: SHIPPED | OUTPATIENT
Start: 2023-09-14

## 2023-09-14 ASSESSMENT — PATIENT HEALTH QUESTIONNAIRE - PHQ9
7. TROUBLE CONCENTRATING ON THINGS, SUCH AS READING THE NEWSPAPER OR WATCHING TELEVISION: 1
8. MOVING OR SPEAKING SO SLOWLY THAT OTHER PEOPLE COULD HAVE NOTICED. OR THE OPPOSITE, BEING SO FIGETY OR RESTLESS THAT YOU HAVE BEEN MOVING AROUND A LOT MORE THAN USUAL: 0
5. POOR APPETITE OR OVEREATING: 3
2. FEELING DOWN, DEPRESSED OR HOPELESS: 3
6. FEELING BAD ABOUT YOURSELF - OR THAT YOU ARE A FAILURE OR HAVE LET YOURSELF OR YOUR FAMILY DOWN: 3
1. LITTLE INTEREST OR PLEASURE IN DOING THINGS: 2
SUM OF ALL RESPONSES TO PHQ QUESTIONS 1-9: 15
SUM OF ALL RESPONSES TO PHQ QUESTIONS 1-9: 15
SUM OF ALL RESPONSES TO PHQ9 QUESTIONS 1 & 2: 5
9. THOUGHTS THAT YOU WOULD BE BETTER OFF DEAD, OR OF HURTING YOURSELF: 0
SUM OF ALL RESPONSES TO PHQ QUESTIONS 1-9: 15
10. IF YOU CHECKED OFF ANY PROBLEMS, HOW DIFFICULT HAVE THESE PROBLEMS MADE IT FOR YOU TO DO YOUR WORK, TAKE CARE OF THINGS AT HOME, OR GET ALONG WITH OTHER PEOPLE: 0
3. TROUBLE FALLING OR STAYING ASLEEP: 1
4. FEELING TIRED OR HAVING LITTLE ENERGY: 2
SUM OF ALL RESPONSES TO PHQ QUESTIONS 1-9: 15

## 2023-09-14 ASSESSMENT — LIFESTYLE VARIABLES
HOW OFTEN DO YOU HAVE A DRINK CONTAINING ALCOHOL: NEVER
HOW MANY STANDARD DRINKS CONTAINING ALCOHOL DO YOU HAVE ON A TYPICAL DAY: PATIENT DOES NOT DRINK

## 2023-09-14 NOTE — PATIENT INSTRUCTIONS
To protect your eyes from damage due to diabetes, please schedule an appointment with your ophthalmologist. Please let us know if you need help doing this. Learning About Mindfulness for Stress  What are mindfulness and stress? Stress is your body's response to a hard situation. Your body can have a physical, emotional, or mental response. A lot of things can cause stress. You may feel stress when you go on a job interview, take a test, or run a race. This kind of short-term stress is normal and even useful. It can help you if you need to work hard or react quickly. Stress also can last a long time. Long-term stress is caused by stressful situations or events. Examples of long-term stress include long-term health problems, ongoing problems at work, and conflicts in your family. Long-term stress can harm your health. Mindfulness is a focus only on things happening in the present moment. It's a process of purposefully paying attention to and being aware of your surroundings, your emotions, your thoughts, and how your body feels. You are aware of these things, but you aren't judging these experiences as \"good\" or \"bad. \" Mindfulness can help you learn to calm your mind and body to help you cope with illness, pain, and stress. How does mindfulness help to relieve stress? Mindfulness can help quiet your mind and relax your body. Studies show that it can help some people sleep better, feel less anxious, and bring their blood pressure down. And it's been shown to help some people live and cope better with certain health problems like heart disease, depression, chronic pain, and cancer. How do you practice mindfulness? To be mindful is to pay attention, to be present, and to be accepting. Like any new skill or habit, being mindful can take practice. When you're mindful, you do just one thing and you pay close attention to that one thing.  For example, you may sit quietly and notice your emotions or how

## 2023-09-16 RX ORDER — LAMOTRIGINE 100 MG/1
100 TABLET ORAL DAILY
Qty: 90 TABLET | Refills: 3 | Status: SHIPPED | OUTPATIENT
Start: 2023-09-16

## 2023-09-20 ENCOUNTER — HOSPITAL ENCOUNTER (OUTPATIENT)
Dept: NON INVASIVE DIAGNOSTICS | Age: 68
Discharge: HOME | End: 2023-09-20
Attending: FAMILY MEDICINE
Payer: MEDICARE

## 2023-09-20 DIAGNOSIS — R01.1 MURMUR: Chronic | ICD-10-CM

## 2023-09-20 PROCEDURE — 93306 TTE W/DOPPLER COMPLETE: CPT

## 2023-09-21 ENCOUNTER — TELEPHONE (OUTPATIENT)
Dept: FAMILY MEDICINE CLINIC | Age: 68
End: 2023-09-21

## 2023-09-21 NOTE — TELEPHONE ENCOUNTER
----- Message from Kaitlyn Tucker MD sent at 9/21/2023  2:22 PM EDT -----  Please let patient know that echocardiogram shows moderate aortic stenosis. This is similar to the findings from over a year ago. The most important thing for her is to keep her blood pressure well controlled and to aim for a healthy weight. Good cholesterol control and also prevention of diabetes is strongly advised. Yearly echocardiograms are needed.

## 2023-09-21 NOTE — RESULT ENCOUNTER NOTE
Please let patient know that echocardiogram shows moderate aortic stenosis. This is similar to the findings from over a year ago. The most important thing for her is to keep her blood pressure well controlled and to aim for a healthy weight. Good cholesterol control and also prevention of diabetes is strongly advised. Yearly echocardiograms are needed.

## 2023-11-18 DIAGNOSIS — I10 ESSENTIAL HYPERTENSION: ICD-10-CM

## 2023-11-18 DIAGNOSIS — E11.65 TYPE 2 DIABETES MELLITUS WITH HYPERGLYCEMIA, WITHOUT LONG-TERM CURRENT USE OF INSULIN (HCC): ICD-10-CM

## 2023-11-18 DIAGNOSIS — F41.1 GENERALIZED ANXIETY DISORDER: ICD-10-CM

## 2023-11-20 RX ORDER — LOSARTAN POTASSIUM 50 MG/1
TABLET ORAL
Qty: 90 TABLET | Refills: 0 | OUTPATIENT
Start: 2023-11-20

## 2023-11-20 RX ORDER — BUSPIRONE HYDROCHLORIDE 15 MG/1
TABLET ORAL
Qty: 270 TABLET | Refills: 0 | OUTPATIENT
Start: 2023-11-20

## 2023-12-15 ENCOUNTER — OFFICE VISIT (OUTPATIENT)
Dept: FAMILY MEDICINE CLINIC | Age: 68
End: 2023-12-15

## 2023-12-15 VITALS
WEIGHT: 237 LBS | HEIGHT: 64 IN | DIASTOLIC BLOOD PRESSURE: 62 MMHG | RESPIRATION RATE: 16 BRPM | HEART RATE: 77 BPM | BODY MASS INDEX: 40.46 KG/M2 | OXYGEN SATURATION: 97 % | TEMPERATURE: 97.7 F | SYSTOLIC BLOOD PRESSURE: 140 MMHG

## 2023-12-15 DIAGNOSIS — E03.9 ACQUIRED HYPOTHYROIDISM: ICD-10-CM

## 2023-12-15 DIAGNOSIS — M25.562 ACUTE PAIN OF LEFT KNEE: ICD-10-CM

## 2023-12-15 DIAGNOSIS — F41.1 GENERALIZED ANXIETY DISORDER: ICD-10-CM

## 2023-12-15 DIAGNOSIS — L30.9 ECZEMA, UNSPECIFIED TYPE: ICD-10-CM

## 2023-12-15 DIAGNOSIS — I35.0 MODERATE AORTIC STENOSIS: ICD-10-CM

## 2023-12-15 DIAGNOSIS — I10 ESSENTIAL HYPERTENSION: ICD-10-CM

## 2023-12-15 DIAGNOSIS — F33.1 MODERATE EPISODE OF RECURRENT MAJOR DEPRESSIVE DISORDER (HCC): Primary | ICD-10-CM

## 2023-12-15 DIAGNOSIS — E11.65 TYPE 2 DIABETES MELLITUS WITH HYPERGLYCEMIA, WITHOUT LONG-TERM CURRENT USE OF INSULIN (HCC): ICD-10-CM

## 2023-12-15 LAB — HBA1C MFR BLD: 7.3 %

## 2023-12-15 RX ORDER — IRBESARTAN 150 MG/1
150 TABLET ORAL DAILY
Qty: 30 TABLET | Refills: 3 | Status: SHIPPED | OUTPATIENT
Start: 2023-12-15

## 2023-12-15 RX ORDER — THYROID 90 MG/1
90 TABLET ORAL DAILY
Qty: 90 TABLET | Refills: 3 | Status: SHIPPED | OUTPATIENT
Start: 2023-12-15

## 2023-12-15 RX ORDER — THYROID 90 MG/1
90 TABLET ORAL DAILY
Qty: 90 TABLET | Refills: 3 | Status: CANCELLED | OUTPATIENT
Start: 2023-12-15

## 2023-12-15 RX ORDER — TRIAMCINOLONE ACETONIDE 5 MG/G
CREAM TOPICAL
Qty: 30 G | Refills: 0 | Status: SHIPPED | OUTPATIENT
Start: 2023-12-15 | End: 2023-12-22

## 2023-12-15 NOTE — PROGRESS NOTES
was losartan as well. She is not taking this. Given avapro and she did not remember she was given this. HTN not controlled. She is not taking   Eczema on face -- spots during winter mostly     Echo showed moderate aortic stenosis. She denies SOB, CP, CHF. BP Readings from Last 10 Encounters:   12/15/23 (!) 144/68   09/14/23 (!) 146/58   06/29/23 130/60   03/09/23 136/64   09/07/22 (!) 142/68   06/10/22 138/80   03/04/22 (!) 142/70   12/01/21 134/68   09/01/21 136/60   07/21/21 (!) 148/72       Lab Results   Component Value Date    LABA1C 7.3 12/15/2023    LABA1C 7.1 (H) 06/29/2023    LABA1C 6.7 03/09/2023       Review of Systems   Constitutional:  Negative for fatigue and fever. Respiratory:  Negative for shortness of breath. Cardiovascular:  Negative for chest pain and leg swelling. Objective   Physical Exam  Constitutional:       General: She is not in acute distress. Appearance: Normal appearance. She is not ill-appearing. Cardiovascular:      Rate and Rhythm: Normal rate and regular rhythm. Heart sounds: No murmur heard. Pulmonary:      Effort: Pulmonary effort is normal. No respiratory distress. Breath sounds: Normal breath sounds. No wheezing. Musculoskeletal:         General: No swelling. Comments: Left knee with some stiffness, mild generalized discomfort. Neurological:      Mental Status: She is alert and oriented to person, place, and time. Psychiatric:         Mood and Affect: Mood normal.        I have spent 42 minutes reviewing previous notes, test results and face to face with the patient discussing the diagnosis and importance of compliance with the treatment plan as well as documenting on the day of the visit. This office note may have been at least partially dictated. Effort was made to review for errors but some may have been missed. Please contact David Swanson of note for clarification if needed.      An electronic signature was used to authenticate

## 2023-12-18 ENCOUNTER — HOSPITAL ENCOUNTER (OUTPATIENT)
Age: 68
Discharge: HOME | End: 2023-12-18
Payer: MEDICARE

## 2023-12-29 ENCOUNTER — TELEPHONE (OUTPATIENT)
Dept: FAMILY MEDICINE CLINIC | Age: 68
End: 2023-12-29

## 2023-12-29 NOTE — TELEPHONE ENCOUNTER
Called patient and explained getting coricidin, honey and saline. Also stated to call for appt if no better and to consider covid testing.

## 2023-12-29 NOTE — TELEPHONE ENCOUNTER
She may use coricidin HBP, honey, nasal saline spray. Evaluation if worsening. Consider [id number] testing.

## 2023-12-29 NOTE — TELEPHONE ENCOUNTER
Ori Dyllan was in for a check up around the 14th now she is coming down with a sinus infection. She has drainage and congestion with a cough. [facility].

## 2024-01-17 DIAGNOSIS — E03.9 ACQUIRED HYPOTHYROIDISM: Primary | ICD-10-CM

## 2024-01-17 RX ORDER — THYROID 90 MG/1
90 TABLET ORAL DAILY
Qty: 90 TABLET | Refills: 3 | Status: SHIPPED | OUTPATIENT
Start: 2024-01-17 | End: 2025-01-16

## 2024-01-17 NOTE — TELEPHONE ENCOUNTER
Pharmacy is calling to see if we are okay with changing this medication to NP rating instead of AB rating. She stated this is the same medication but cheaper. Please let her know. This is for her thyroid medication.

## 2024-01-17 NOTE — TELEPHONE ENCOUNTER
Nury Wong called requesting a refill on the following medications:  Requested Prescriptions     Pending Prescriptions Disp Refills    thyroid (NP THYROID) 90 MG tablet 90 tablet 3     Sig: Take 1 tablet by mouth daily       Date of last visit: 12/15/2023  Date of next visit (if applicable):3/26/2024  Date of last refill:   [facility] Name: [facility] [place]    Rx pended      Thanks,  Melany Maravilla LPN

## 2024-03-26 ENCOUNTER — OFFICE VISIT (OUTPATIENT)
Dept: FAMILY MEDICINE CLINIC | Age: 69
End: 2024-03-26
Payer: MEDICARE

## 2024-03-26 VITALS
HEIGHT: 64 IN | WEIGHT: 238.2 LBS | DIASTOLIC BLOOD PRESSURE: 58 MMHG | RESPIRATION RATE: 18 BRPM | SYSTOLIC BLOOD PRESSURE: 152 MMHG | BODY MASS INDEX: 40.67 KG/M2 | TEMPERATURE: 98.6 F | OXYGEN SATURATION: 95 % | HEART RATE: 85 BPM

## 2024-03-26 DIAGNOSIS — Z78.0 POST-MENOPAUSAL: ICD-10-CM

## 2024-03-26 DIAGNOSIS — E11.65 TYPE 2 DIABETES MELLITUS WITH HYPERGLYCEMIA, WITHOUT LONG-TERM CURRENT USE OF INSULIN (HCC): Primary | ICD-10-CM

## 2024-03-26 DIAGNOSIS — E66.01 OBESITY, CLASS III, BMI 40-49.9 (MORBID OBESITY) (HCC): ICD-10-CM

## 2024-03-26 DIAGNOSIS — E03.9 ACQUIRED HYPOTHYROIDISM: ICD-10-CM

## 2024-03-26 DIAGNOSIS — F41.1 GENERALIZED ANXIETY DISORDER: ICD-10-CM

## 2024-03-26 DIAGNOSIS — Z13.820 SCREENING FOR OSTEOPOROSIS: ICD-10-CM

## 2024-03-26 DIAGNOSIS — M25.551 RIGHT HIP PAIN: ICD-10-CM

## 2024-03-26 DIAGNOSIS — F33.42 RECURRENT MAJOR DEPRESSIVE DISORDER, IN FULL REMISSION (HCC): ICD-10-CM

## 2024-03-26 DIAGNOSIS — F33.1 MODERATE EPISODE OF RECURRENT MAJOR DEPRESSIVE DISORDER (HCC): ICD-10-CM

## 2024-03-26 DIAGNOSIS — M25.562 CHRONIC PAIN OF LEFT KNEE: ICD-10-CM

## 2024-03-26 DIAGNOSIS — I10 ESSENTIAL HYPERTENSION: ICD-10-CM

## 2024-03-26 DIAGNOSIS — G89.29 CHRONIC PAIN OF LEFT KNEE: ICD-10-CM

## 2024-03-26 LAB — HBA1C MFR BLD: 7.2 %

## 2024-03-26 PROCEDURE — G8484 FLU IMMUNIZE NO ADMIN: HCPCS | Performed by: FAMILY MEDICINE

## 2024-03-26 PROCEDURE — G8417 CALC BMI ABV UP PARAM F/U: HCPCS | Performed by: FAMILY MEDICINE

## 2024-03-26 PROCEDURE — 1036F TOBACCO NON-USER: CPT | Performed by: FAMILY MEDICINE

## 2024-03-26 PROCEDURE — 3078F DIAST BP <80 MM HG: CPT | Performed by: FAMILY MEDICINE

## 2024-03-26 PROCEDURE — G8399 PT W/DXA RESULTS DOCUMENT: HCPCS | Performed by: FAMILY MEDICINE

## 2024-03-26 PROCEDURE — 1123F ACP DISCUSS/DSCN MKR DOCD: CPT | Performed by: FAMILY MEDICINE

## 2024-03-26 PROCEDURE — 1090F PRES/ABSN URINE INCON ASSESS: CPT | Performed by: FAMILY MEDICINE

## 2024-03-26 PROCEDURE — 2022F DILAT RTA XM EVC RTNOPTHY: CPT | Performed by: FAMILY MEDICINE

## 2024-03-26 PROCEDURE — 83036 HEMOGLOBIN GLYCOSYLATED A1C: CPT | Performed by: FAMILY MEDICINE

## 2024-03-26 PROCEDURE — G8427 DOCREV CUR MEDS BY ELIG CLIN: HCPCS | Performed by: FAMILY MEDICINE

## 2024-03-26 PROCEDURE — 3077F SYST BP >= 140 MM HG: CPT | Performed by: FAMILY MEDICINE

## 2024-03-26 PROCEDURE — 3017F COLORECTAL CA SCREEN DOC REV: CPT | Performed by: FAMILY MEDICINE

## 2024-03-26 PROCEDURE — 99214 OFFICE O/P EST MOD 30 MIN: CPT | Performed by: FAMILY MEDICINE

## 2024-03-26 PROCEDURE — 3051F HG A1C>EQUAL 7.0%<8.0%: CPT | Performed by: FAMILY MEDICINE

## 2024-03-26 RX ORDER — DULAGLUTIDE 0.75 MG/.5ML
0.75 INJECTION, SOLUTION SUBCUTANEOUS WEEKLY
Qty: 2 ML | Refills: 3 | Status: SHIPPED | OUTPATIENT
Start: 2024-03-26

## 2024-03-26 RX ORDER — LAMOTRIGINE 100 MG/1
100 TABLET ORAL DAILY
Qty: 90 TABLET | Refills: 3 | Status: SHIPPED | OUTPATIENT
Start: 2024-03-26

## 2024-03-26 RX ORDER — PAROXETINE HYDROCHLORIDE 40 MG/1
40 TABLET, FILM COATED ORAL EVERY MORNING
Qty: 90 TABLET | Refills: 3 | Status: SHIPPED | OUTPATIENT
Start: 2024-03-26

## 2024-03-26 RX ORDER — ATORVASTATIN CALCIUM 10 MG/1
10 TABLET, FILM COATED ORAL DAILY
Qty: 90 TABLET | Refills: 3 | Status: SHIPPED | OUTPATIENT
Start: 2024-03-26

## 2024-03-26 RX ORDER — IRBESARTAN 150 MG/1
150 TABLET ORAL DAILY
Qty: 90 TABLET | Refills: 3 | Status: SHIPPED | OUTPATIENT
Start: 2024-03-26

## 2024-03-26 RX ORDER — BUSPIRONE HYDROCHLORIDE 15 MG/1
TABLET ORAL
Qty: 270 TABLET | Refills: 1 | Status: SHIPPED | OUTPATIENT
Start: 2024-03-26

## 2024-03-26 RX ORDER — THYROID 90 MG/1
90 TABLET ORAL DAILY
Qty: 90 TABLET | Refills: 3 | Status: SHIPPED | OUTPATIENT
Start: 2024-03-26 | End: 2025-03-26

## 2024-03-26 SDOH — ECONOMIC STABILITY: INCOME INSECURITY: HOW HARD IS IT FOR YOU TO PAY FOR THE VERY BASICS LIKE FOOD, HOUSING, MEDICAL CARE, AND HEATING?: NOT HARD AT ALL

## 2024-03-26 SDOH — ECONOMIC STABILITY: FOOD INSECURITY: WITHIN THE PAST 12 MONTHS, THE FOOD YOU BOUGHT JUST DIDN'T LAST AND YOU DIDN'T HAVE MONEY TO GET MORE.: NEVER TRUE

## 2024-03-26 SDOH — ECONOMIC STABILITY: FOOD INSECURITY: WITHIN THE PAST 12 MONTHS, YOU WORRIED THAT YOUR FOOD WOULD RUN OUT BEFORE YOU GOT MONEY TO BUY MORE.: NEVER TRUE

## 2024-03-26 ASSESSMENT — PATIENT HEALTH QUESTIONNAIRE - PHQ9
SUM OF ALL RESPONSES TO PHQ9 QUESTIONS 1 & 2: 5
5. POOR APPETITE OR OVEREATING: NEARLY EVERY DAY
3. TROUBLE FALLING OR STAYING ASLEEP: SEVERAL DAYS
1. LITTLE INTEREST OR PLEASURE IN DOING THINGS: MORE THAN HALF THE DAYS
10. IF YOU CHECKED OFF ANY PROBLEMS, HOW DIFFICULT HAVE THESE PROBLEMS MADE IT FOR YOU TO DO YOUR WORK, TAKE CARE OF THINGS AT HOME, OR GET ALONG WITH OTHER PEOPLE: SOMEWHAT DIFFICULT
SUM OF ALL RESPONSES TO PHQ QUESTIONS 1-9: 15
8. MOVING OR SPEAKING SO SLOWLY THAT OTHER PEOPLE COULD HAVE NOTICED. OR THE OPPOSITE, BEING SO FIGETY OR RESTLESS THAT YOU HAVE BEEN MOVING AROUND A LOT MORE THAN USUAL: NOT AT ALL
6. FEELING BAD ABOUT YOURSELF - OR THAT YOU ARE A FAILURE OR HAVE LET YOURSELF OR YOUR FAMILY DOWN: MORE THAN HALF THE DAYS
9. THOUGHTS THAT YOU WOULD BE BETTER OFF DEAD, OR OF HURTING YOURSELF: NOT AT ALL
7. TROUBLE CONCENTRATING ON THINGS, SUCH AS READING THE NEWSPAPER OR WATCHING TELEVISION: NEARLY EVERY DAY
4. FEELING TIRED OR HAVING LITTLE ENERGY: SEVERAL DAYS
2. FEELING DOWN, DEPRESSED OR HOPELESS: NEARLY EVERY DAY

## 2024-03-26 ASSESSMENT — ENCOUNTER SYMPTOMS: SHORTNESS OF BREATH: 0

## 2024-03-26 NOTE — PROGRESS NOTES
Nury Wong (:  1955) is a 68 y.o. female,Established patient, here for evaluation of the following chief complaint(s):  Diabetes (Having some pain in the right hip yesterday from getting in and out of her vehicle she states its not as bad today and left knee is painful from arthritis. )       ASSESSMENT/PLAN:  1. Type 2 diabetes mellitus with hyperglycemia, without long-term current use of insulin ([facility])  -     POCT glycosylated hemoglobin (Hb A1C)  -     atorvastatin (LIPITOR) 10 MG tablet; Take 1 tablet by mouth daily, Disp-90 tablet, R-3Normal  -     Comprehensive Metabolic Panel; Future  -     CBC with Auto Differential; Future  -     Hemoglobin A1C; Future  -     Lipid Panel; Future  -     Magnesium; Future  -     Microalbumin / Creatinine Urine Ratio; Future  -     TSH with Reflex; Future  -     Dulaglutide (TRULICITY) 0.75 MG/0.5ML SOPN; Inject 0.75 mg into the skin once a week, Disp-2 mL, R-3Normal  2. Essential hypertension  -     irbesartan (AVAPRO) 150 MG tablet; Take 1 tablet by mouth daily, Disp-90 tablet, R-3Normal  -     Comprehensive Metabolic Panel; Future  -     CBC with Auto Differential; Future  -     Hemoglobin A1C; Future  -     Lipid Panel; Future  -     Magnesium; Future  -     Microalbumin / Creatinine Urine Ratio; Future  -     TSH with Reflex; Future  3. Acquired hypothyroidism  -     thyroid (NP THYROID) 90 MG tablet; Take 1 tablet by mouth daily, Disp-90 tablet, R-3Normal  -     Comprehensive Metabolic Panel; Future  -     CBC with Auto Differential; Future  -     Hemoglobin A1C; Future  -     Lipid Panel; Future  -     Magnesium; Future  -     Microalbumin / Creatinine Urine Ratio; Future  -     TSH with Reflex; Future  4. Recurrent major depressive disorder, in full remission ([facility])  -     PARoxetine (PAXIL) 40 MG tablet; Take 1 tablet by mouth every morning, Disp-90 tablet, R-3Normal  -     lamoTRIgine (LAMICTAL) 100 MG tablet; Take 1 tablet by mouth daily, Disp-90 tablet,

## 2024-03-26 NOTE — PATIENT INSTRUCTIONS
Check out \"Classical Stretch\" by Sheree Mendoza.   Technique of To8to to gently stretch muscle and strengthen them.    Aim to stretch several 5 -10 minutes daily if you have chronic joint trouble.  For the next level of health, aim for 30 minutes of stretching and aerobic work out 3 times a week.  She has website, streaming options and youtube videos.      [email or web address] My Surgery/videos/     Here are mini-workout examples: -- there are many others:    For back pain and tight body joints,    To8to Aging Backwards #5 - Relieve Your Pain (under 10 min.)  [email or web address]?v=[email or web address]    [employer] Aging Backwards #3 - Sooth Your Joints  https://www.youtube.com/watch?v=[email or web address]

## 2024-05-01 ENCOUNTER — HOSPITAL ENCOUNTER (OUTPATIENT)
Dept: WOMENS IMAGING | Age: 69
Discharge: HOME | End: 2024-05-01
Attending: FAMILY MEDICINE
Payer: MEDICARE

## 2024-05-01 VITALS — HEIGHT: 63 IN | WEIGHT: 238 LBS | BODY MASS INDEX: 42.17 KG/M2

## 2024-05-01 DIAGNOSIS — Z12.31 ENCOUNTER FOR SCREENING MAMMOGRAM FOR MALIGNANT NEOPLASM OF BREAST: ICD-10-CM

## 2024-05-01 DIAGNOSIS — Z13.820 SCREENING FOR OSTEOPOROSIS: ICD-10-CM

## 2024-05-01 DIAGNOSIS — Z78.0 POST-MENOPAUSAL: ICD-10-CM

## 2024-05-01 PROCEDURE — 77080 DXA BONE DENSITY AXIAL: CPT

## 2024-05-01 PROCEDURE — 77063 BREAST TOMOSYNTHESIS BI: CPT

## 2024-05-08 ENCOUNTER — HOSPITAL ENCOUNTER (OUTPATIENT)
Dept: WOMENS IMAGING | Age: 69
Discharge: HOME | End: 2024-05-08
Attending: RADIOLOGY
Payer: MEDICARE

## 2024-05-08 DIAGNOSIS — R92.8 ABNORMAL MAMMOGRAM: ICD-10-CM

## 2024-05-08 PROCEDURE — G0279 TOMOSYNTHESIS, MAMMO: HCPCS

## 2024-05-10 ENCOUNTER — CARE COORDINATION (OUTPATIENT)
Dept: CARE COORDINATION | Age: 69
End: 2024-05-10

## 2024-05-10 NOTE — CARE COORDINATION
Patient reached out to me inquiring about helping her with the cost of all of her medications. I went over her drug list with her and all but Trulicity are available in generic form. The manufacturers do not offer assistance to generic medications. I explained Trulicity isn't accepting any new patients but suggested the patient speak with  to see if Bydurion or Ozempic could be an alternative to switch to since they do have a program. She will let me know what happens.          Rosmery Diaz [facility]  CC   Medication Assistance  [place] and [place] [employer]    (P) [phone]  (F) [phone]

## 2024-05-13 ENCOUNTER — HOSPITAL ENCOUNTER (OUTPATIENT)
Dept: WOMENS IMAGING | Age: 69
Discharge: HOME | End: 2024-05-13
Payer: MEDICARE

## 2024-05-13 DIAGNOSIS — R92.1 BREAST CALCIFICATIONS: ICD-10-CM

## 2024-05-13 PROCEDURE — 88305 TISSUE EXAM BY PATHOLOGIST: CPT

## 2024-05-13 PROCEDURE — 19081 BX BREAST 1ST LESION STRTCTC: CPT

## 2024-05-13 PROCEDURE — 77065 DX MAMMO INCL CAD UNI: CPT

## 2024-05-13 NOTE — PROGRESS NOTES
Breast Biopsy Flowsheet/Post-Operative Care    Date of Procedure: 5/13/2024  Physician: Dr. Parada  Technologist: Sherlyn Flynn RT(R)(M)    Biopsy:stereotactic breast biopsy  Lesion type: Non-palpable  Breast: right    Clock face position: Site #1: upper         Primary Method of Detection: Mammogram      Microcalcifications   Distribution: Grouped        Biopsy Method:   Mammotome:    Site # 1    Gauge: 10    # of Passes: 5     Clip: Coil        Pre-Op Assessment: (BI-RADS)   4. Suspicious Abnormality    Patient Tolerated Procedure: good  Complications: n/a  Comments: n/a    Post Operative Care  Steri strips: Yes  Dressing: Gauze, Tape   Ice Applied to Site:  No  Evidence of Bleeding:  No    Pain Verbalized: No      Written Discharge Instructions: Yes  Condition at Discharge: good  Time of Discharge: 1031    Electronically signed by Melany York on 5/13/2024 at 10:33 AM

## 2024-05-13 NOTE — PROGRESS NOTES
[facility]  Pre-Biopsy Assessment      Patient Education    Written information about procedure Yes  right   Procedural steps explained Yes Stereotactic Biopsy   Post-op potential: bruising, hematoma, pain Yes    Self-care: activity, care of dressing Yes    Patient verbalized understanding Yes    Consent signed and witnessed Yes      Hormone Therapy Status: n/a    Recent Medication: N/A Last Dose: n/a                                     Hormone Replacement Therapy: no    Previous Breast Biopsy: no    Previous Diagnosis Cancer: yes - uterine cancer age 55    Hysterectomy:yes -     Emotional Status: Calm    Language or Physical Barriers: n/a    Comments: n/a      Electronically signed by Melany York on 5/13/2024 at 9:33 AM

## 2024-05-14 ENCOUNTER — CLINICAL DOCUMENTATION (OUTPATIENT)
Dept: WOMENS IMAGING | Age: 69
End: 2024-05-14

## 2024-05-14 DIAGNOSIS — R92.1 BREAST CALCIFICATIONS: ICD-10-CM

## 2024-05-14 DIAGNOSIS — Z09 FOLLOW-UP EXAM: Primary | ICD-10-CM

## 2024-05-14 NOTE — PROGRESS NOTES
Contact Type :    Telephone  Notes :  After consulting with Dr. Parada,  Nury was contacted by telephone.  She was informed of the negative biopsy results and the need to return for a 6 month follow up.  She voiced understanding.    Biopsy site: doing well     Results faxed to: Monique Tejada MD

## 2024-06-25 ENCOUNTER — OFFICE VISIT (OUTPATIENT)
Dept: FAMILY MEDICINE CLINIC | Age: 69
End: 2024-06-25

## 2024-06-25 ENCOUNTER — TELEPHONE (OUTPATIENT)
Dept: FAMILY MEDICINE CLINIC | Age: 69
End: 2024-06-25

## 2024-06-25 VITALS
HEIGHT: 63 IN | WEIGHT: 238 LBS | DIASTOLIC BLOOD PRESSURE: 70 MMHG | HEART RATE: 80 BPM | RESPIRATION RATE: 16 BRPM | BODY MASS INDEX: 42.17 KG/M2 | SYSTOLIC BLOOD PRESSURE: 142 MMHG

## 2024-06-25 DIAGNOSIS — I35.0 MODERATE AORTIC STENOSIS: ICD-10-CM

## 2024-06-25 DIAGNOSIS — Z00.00 MEDICARE ANNUAL WELLNESS VISIT, SUBSEQUENT: Primary | ICD-10-CM

## 2024-06-25 DIAGNOSIS — F33.42 RECURRENT MAJOR DEPRESSIVE DISORDER, IN FULL REMISSION (HCC): ICD-10-CM

## 2024-06-25 DIAGNOSIS — E11.65 TYPE 2 DIABETES MELLITUS WITH HYPERGLYCEMIA, WITHOUT LONG-TERM CURRENT USE OF INSULIN (HCC): ICD-10-CM

## 2024-06-25 DIAGNOSIS — R21 RASH: ICD-10-CM

## 2024-06-25 DIAGNOSIS — E03.9 ACQUIRED HYPOTHYROIDISM: ICD-10-CM

## 2024-06-25 LAB — HBA1C MFR BLD: 9.9 %

## 2024-06-25 RX ORDER — LAMOTRIGINE 100 MG/1
100 TABLET ORAL 2 TIMES DAILY
Qty: 180 TABLET | Refills: 3 | Status: SHIPPED | OUTPATIENT
Start: 2024-06-25 | End: 2025-06-25

## 2024-06-25 RX ORDER — ORAL SEMAGLUTIDE 3 MG/1
TABLET ORAL
Qty: 90 TABLET | Refills: 3 | Status: SHIPPED | OUTPATIENT
Start: 2024-06-25

## 2024-06-25 RX ORDER — GLIPIZIDE 5 MG/1
5 TABLET ORAL 2 TIMES DAILY
Qty: 180 TABLET | Refills: 3 | Status: SHIPPED | OUTPATIENT
Start: 2024-06-25 | End: 2025-06-25

## 2024-06-25 RX ORDER — THYROID 90 MG/1
90 TABLET ORAL DAILY
Qty: 90 TABLET | Refills: 3 | Status: SHIPPED | OUTPATIENT
Start: 2024-06-25 | End: 2025-06-25

## 2024-06-25 ASSESSMENT — PATIENT HEALTH QUESTIONNAIRE - PHQ9
SUM OF ALL RESPONSES TO PHQ QUESTIONS 1-9: 11
3. TROUBLE FALLING OR STAYING ASLEEP: MORE THAN HALF THE DAYS
9. THOUGHTS THAT YOU WOULD BE BETTER OFF DEAD, OR OF HURTING YOURSELF: NOT AT ALL
10. IF YOU CHECKED OFF ANY PROBLEMS, HOW DIFFICULT HAVE THESE PROBLEMS MADE IT FOR YOU TO DO YOUR WORK, TAKE CARE OF THINGS AT HOME, OR GET ALONG WITH OTHER PEOPLE: SOMEWHAT DIFFICULT
4. FEELING TIRED OR HAVING LITTLE ENERGY: NEARLY EVERY DAY
8. MOVING OR SPEAKING SO SLOWLY THAT OTHER PEOPLE COULD HAVE NOTICED. OR THE OPPOSITE, BEING SO FIGETY OR RESTLESS THAT YOU HAVE BEEN MOVING AROUND A LOT MORE THAN USUAL: NOT AT ALL
1. LITTLE INTEREST OR PLEASURE IN DOING THINGS: MORE THAN HALF THE DAYS
SUM OF ALL RESPONSES TO PHQ QUESTIONS 1-9: 11
SUM OF ALL RESPONSES TO PHQ QUESTIONS 1-9: 11
5. POOR APPETITE OR OVEREATING: NOT AT ALL
6. FEELING BAD ABOUT YOURSELF - OR THAT YOU ARE A FAILURE OR HAVE LET YOURSELF OR YOUR FAMILY DOWN: SEVERAL DAYS
SUM OF ALL RESPONSES TO PHQ QUESTIONS 1-9: 11
SUM OF ALL RESPONSES TO PHQ9 QUESTIONS 1 & 2: 4
7. TROUBLE CONCENTRATING ON THINGS, SUCH AS READING THE NEWSPAPER OR WATCHING TELEVISION: SEVERAL DAYS
2. FEELING DOWN, DEPRESSED OR HOPELESS: MORE THAN HALF THE DAYS

## 2024-06-25 NOTE — TELEPHONE ENCOUNTER
Patient was seen today with Dr. Tejada and stated she is needing help with affording her Rybelsus medication.     Please let us know if we need to do anything on our end. [employer] Provider portion completed and pending the provider's signature. Will scan into chart once signed.

## 2024-06-25 NOTE — PROGRESS NOTES
Medicare Annual Wellness Visit    Nury Wong is here for Medicare AWV    Assessment & Plan   Medicare annual wellness visit, subsequent  Type 2 diabetes mellitus with hyperglycemia, without long-term current use of insulin (HCC)  -     POCT glycosylated hemoglobin (Hb A1C)  -     POCT microalbumin  -     Comprehensive Metabolic Panel; Future  -     CBC with Auto Differential; Future  -     Lipid Panel; Future  -     TSH with Reflex; Future  -     Vitamin B12; Future  -     Magnesium; Future  -     Semaglutide (RYBELSUS) 3 MG TABS; 1 po daily via MAP, Disp-90 tablet, R-3Print  -     glipiZIDE (GLUCOTROL) 5 MG tablet; Take 1 tablet by mouth 2 times daily, Disp-180 tablet, R-3Normal  Recurrent major depressive disorder, in full remission (HCC)  -     Comprehensive Metabolic Panel; Future  -     CBC with Auto Differential; Future  -     Lipid Panel; Future  -     TSH with Reflex; Future  -     Vitamin B12; Future  -     Magnesium; Future  -     lamoTRIgine (LAMICTAL) 100 MG tablet; Take 1 tablet by mouth 2 times daily, Disp-180 tablet, R-3Normal  Acquired hypothyroidism  -     thyroid (NP THYROID) 90 MG tablet; Take 1 tablet by mouth daily, Disp-90 tablet, R-3Print  Rash -- lower anterior neck, eczematous  Moderate aortic stenosis -- 3/2022, 9/2023. monitor yearly  -     Echo (TTE) complete (PRN contrast/bubble/strain/3D); Future    Recommendations for Preventive Services Due: see orders and patient instructions/AVS.    Stop pop and other sweetened drinks.  Focus on protein and vegetables with some fruits and whole grains    Would benefit from counseling    Twice a day cream, up to two weeks consecutively    Recommended screening schedule for the next 5-10 years is provided to the patient in written form: see Patient Instructions/AVS.     Return in about 3 months (around 9/25/2024).     Subjective   Chief Complaint   Patient presents with    Medicare AWV      She is struggling with 's behavior and attitude.

## 2024-06-25 NOTE — PATIENT INSTRUCTIONS
once. A better idea might be to focus on small changes and stick with them. When those changes become habit, you can add a few more changes.  Some people find it helpful to take an exercise or nutrition class. If you have questions, ask your doctor about seeing a registered dietitian or an exercise specialist. You might also think about joining a weight-loss support group.  If you're not ready to make changes right now, try to pick a date in the future. Then make an appointment with your doctor to talk about when and how you'll get started with a plan.  Follow-up care is a key part of your treatment and safety. Be sure to make and go to all appointments, and call your doctor if you are having problems. It's also a good idea to know your test results and keep a list of the medicines you take.  How can you care for yourself at home?  Set realistic goals. Many people expect to lose much more weight than is likely. A weight loss of 5% to 10% of your body weight may be enough to improve your health.  Get family and friends involved to provide support. Talk to them about why you are trying to lose weight, and ask them to help. They can help by participating in exercise and having meals with you, even if they may be eating something different.  Find what works best for you. If you do not have time or do not like to cook, a program that offers meal replacement bars or shakes may be better for you. Or if you like to prepare meals, finding a plan that includes daily menus and recipes may be best.  Ask your doctor about other health professionals who can help you achieve your weight loss goals.  A dietitian can help you make healthy changes in your diet.  An exercise specialist or  can help you develop a safe and effective exercise program.  A counselor or psychiatrist can help you cope with issues such as depression, anxiety, or family problems that can make it hard to focus on weight loss.  Consider joining a

## 2024-06-26 ENCOUNTER — CARE COORDINATION (OUTPATIENT)
Dept: CARE COORDINATION | Age: 69
End: 2024-06-26

## 2024-06-26 NOTE — CARE COORDINATION
Spoke with patient and we are going to try to get her assistance on Rybelsis. I faxed her provider and will mail her info soon.        Rosmery Diaz [facility]  CC   Medication Assistance  [place] and [employer]    (P) [phone]  (F) [phone]

## 2024-07-01 ENCOUNTER — CARE COORDINATION (OUTPATIENT)
Dept: CARE COORDINATION | Age: 69
End: 2024-07-01

## 2024-07-23 ENCOUNTER — CARE COORDINATION (OUTPATIENT)
Dept: CARE COORDINATION | Age: 69
End: 2024-07-23

## 2024-07-23 NOTE — CARE COORDINATION
Nury called stating she got a refill on Ryblesis from [place] office. After investigating we found out it wasn't her refill but her husbands though the PAP program. She has not returned her portion of the application to me yet to be approved. She is going to drop this off to the office tomorrow.      Rosmery Diaz [facility]  CC   Medication Assistance  [place] and [place] [facility]    (P) [phone]  (F) [phone]

## 2024-08-11 DIAGNOSIS — E03.9 ACQUIRED HYPOTHYROIDISM: ICD-10-CM

## 2024-08-12 RX ORDER — LEVOTHYROXINE, LIOTHYRONINE 57; 13.5 UG/1; UG/1
90 TABLET ORAL DAILY
Qty: 90 TABLET | Refills: 3 | OUTPATIENT
Start: 2024-08-12

## 2024-08-15 DIAGNOSIS — F41.1 GENERALIZED ANXIETY DISORDER: ICD-10-CM

## 2024-08-15 RX ORDER — BUSPIRONE HYDROCHLORIDE 15 MG/1
TABLET ORAL
Qty: 270 TABLET | Refills: 0 | Status: SHIPPED | OUTPATIENT
Start: 2024-08-15

## 2024-08-15 NOTE — TELEPHONE ENCOUNTER
Nury Wong called requesting a refill on the following medications:  Requested Prescriptions     Pending Prescriptions Disp Refills    busPIRone (BUSPAR) 15 MG tablet [Pharmacy Med Name: BUSPIRONE HYDROCHLORIDE 15 MG Tablet] 270 tablet 3     Sig: TAKE 1 TABLET THREE TIMES DAILY       Date of last visit: 6/25/2024  Date of next visit (if applicable):9/26/2024  Date of last refill: 03/26/24  Pharmacy Name: Shannon Cam LPN

## 2024-09-04 ENCOUNTER — TELEPHONE (OUTPATIENT)
Dept: FAMILY MEDICINE CLINIC | Age: 69
End: 2024-09-04

## 2024-09-04 ENCOUNTER — HOSPITAL ENCOUNTER (OUTPATIENT)
Age: 69
Discharge: HOME | End: 2024-09-04
Payer: MEDICARE

## 2024-09-04 ENCOUNTER — OFFICE VISIT (OUTPATIENT)
Dept: FAMILY MEDICINE CLINIC | Age: 69
End: 2024-09-04

## 2024-09-04 VITALS
WEIGHT: 242.25 LBS | OXYGEN SATURATION: 98 % | HEIGHT: 64 IN | DIASTOLIC BLOOD PRESSURE: 66 MMHG | SYSTOLIC BLOOD PRESSURE: 142 MMHG | BODY MASS INDEX: 41.36 KG/M2 | HEART RATE: 82 BPM | RESPIRATION RATE: 18 BRPM

## 2024-09-04 DIAGNOSIS — R23.2 HOT FLASHES: ICD-10-CM

## 2024-09-04 DIAGNOSIS — R23.2 HOT FLASHES: Primary | ICD-10-CM

## 2024-09-04 DIAGNOSIS — E03.9 ACQUIRED HYPOTHYROIDISM: ICD-10-CM

## 2024-09-04 DIAGNOSIS — E11.65 TYPE 2 DIABETES MELLITUS WITH HYPERGLYCEMIA, WITHOUT LONG-TERM CURRENT USE OF INSULIN (HCC): ICD-10-CM

## 2024-09-04 PROCEDURE — 84443 ASSAY THYROID STIM HORMONE: CPT

## 2024-09-04 PROCEDURE — 84439 ASSAY OF FREE THYROXINE: CPT

## 2024-09-04 PROCEDURE — 36415 COLL VENOUS BLD VENIPUNCTURE: CPT

## 2024-09-04 RX ORDER — ISOPROPYL ALCOHOL 700 MG/ML
1 CLOTH TOPICAL 3 TIMES DAILY
Qty: 300 EACH | Refills: 5 | Status: SHIPPED | OUTPATIENT
Start: 2024-09-04

## 2024-09-04 RX ORDER — LANCETS 30 GAUGE
1 EACH MISCELLANEOUS 3 TIMES DAILY
Qty: 300 EACH | Refills: 5 | Status: SHIPPED | OUTPATIENT
Start: 2024-09-04

## 2024-09-04 RX ORDER — GLUCOSAMINE HCL/CHONDROITIN SU 500-400 MG
CAPSULE ORAL
Qty: 300 STRIP | Refills: 5 | Status: SHIPPED | OUTPATIENT
Start: 2024-09-04

## 2024-09-04 ASSESSMENT — ENCOUNTER SYMPTOMS
NAUSEA: 0
COUGH: 0
ABDOMINAL PAIN: 0
SHORTNESS OF BREATH: 0
EYE PAIN: 0
SORE THROAT: 0
VOMITING: 0
CHEST TIGHTNESS: 0

## 2024-09-04 NOTE — TELEPHONE ENCOUNTER
Patient stated on her way out of her appointment today that she is not taking the RYBELSUS. She did not received this through Patient assistance.     After looking it up. Patient never dropped off her portion of the application. Is this still something you would like patient to take.     Please advise

## 2024-09-04 NOTE — TELEPHONE ENCOUNTER
She has uncontrolled DM ii.  Looks like she gained more weight.  Yes, I recommend she try Rybelsus. She will need to collaborate with her part.    Hemoglobin A1C   Date Value Ref Range Status   06/25/2024 9.9 % Final     Wt Readings from Last 3 Encounters:   09/04/24 109.9 kg (242 lb 4 oz)   06/25/24 108 kg (238 lb)   05/01/24 108 kg (238 lb)

## 2024-09-04 NOTE — PROGRESS NOTES
[facility] PROFESSIONAL [facility] MEDICINE  [address]  [facility] [address]  Dept: [phone]  Loc: [phone]     Nury Wong (:  1955) is a 69 y.o. female, here for evaluation of the following chief complaint(s):  OTHER (Had a full hysterectomy . Starting to have hot flashes and night sweats)      ASSESSMENT/PLAN:  1. Hot flashes  Comments:  Will check labs for cause of hot flashes. Will call with any worsening or new symptoms.  Orders:  -     TSH; Future  -     T4, Free; Future  2. Acquired hypothyroidism  -     TSH; Future  -     T4, Free; Future  3. Type 2 diabetes mellitus with hyperglycemia, without long-term current use of insulin (HCC)  -     blood glucose monitor kit and supplies; Dispense sufficient amount for indicated testing frequency plus additional to accommodate PRN testing needs. Dispense all needed supplies to include: monitor, strips, lancing device, lancets, control solutions, alcohol swabs., Disp-1 kit, R-0, Normal  -     blood glucose monitor strips; Test 3 times a day & as needed for symptoms of irregular blood glucose. Dispense sufficient amount for indicated testing frequency plus additional to accommodate PRN testing needs., Disp-300 strip, R-5, Normal  -     Lancets MISC; 3 TIMES DAILY Starting Wed 2024, Disp-300 each, R-5, Normal  -     Isopropyl Alcohol (ALCOHOL WIPES) 70 % MISC; Apply 1 each topically in the morning, at noon, and at bedtime, Disp-300 each, R-5Normal      Return for Regular follow up as scheduled and as needed - 24 with Dr Tejada.    SUBJECTIVE/OBJECTIVE:  Patient states she started having hot flashes and night sweats 2 weeks ago. Symptoms are unchanged. States if she is working in the house she will overheat and has to stop and sit down with a cold rag on her head. No fever or chills. No muscle or body aches. States she takes care of multiple family members and is constantly running around. No

## 2024-09-05 ENCOUNTER — TELEPHONE (OUTPATIENT)
Dept: FAMILY MEDICINE CLINIC | Age: 69
End: 2024-09-05

## 2024-09-05 ENCOUNTER — PATIENT MESSAGE (OUTPATIENT)
Dept: FAMILY MEDICINE CLINIC | Age: 69
End: 2024-09-05

## 2024-09-05 LAB
T4 FREE SERPL-MCNC: 0.72 NG/DL (ref 0.93–1.68)
TSH SERPL DL<=0.005 MIU/L-ACNC: 1.3 UIU/ML (ref 0.4–4.2)

## 2024-09-05 NOTE — TELEPHONE ENCOUNTER
----- Message from ANUSHA Jacinto CNP sent at 9/5/2024  9:03 AM EDT -----  Thyroid labs are normal.

## 2024-09-05 NOTE — TELEPHONE ENCOUNTER
When I spoke with her on 7/23 we found out she never returned her information to me to get approved into the PAP program. She was going to drop this info off to your office but I don't see that she has. I can't get her approved until she does her portion.

## 2024-09-05 NOTE — TELEPHONE ENCOUNTER
Spoke with patient and she stated she has never received any Rybelsus from [facility], are you able to check in to this?

## 2024-09-05 NOTE — TELEPHONE ENCOUNTER
Advised patient we are still needing her portion of the application. She stated she would look at home to see if she still have the paper work.

## 2024-09-17 ENCOUNTER — HOSPITAL ENCOUNTER (OUTPATIENT)
Age: 69
Discharge: HOME | End: 2024-09-17
Payer: MEDICARE

## 2024-09-17 DIAGNOSIS — F33.42 RECURRENT MAJOR DEPRESSIVE DISORDER, IN FULL REMISSION (HCC): ICD-10-CM

## 2024-09-17 DIAGNOSIS — E11.65 TYPE 2 DIABETES MELLITUS WITH HYPERGLYCEMIA, WITHOUT LONG-TERM CURRENT USE OF INSULIN (HCC): ICD-10-CM

## 2024-09-17 LAB
ALBUMIN SERPL BCG-MCNC: 3.8 G/DL (ref 3.5–5.1)
ALP SERPL-CCNC: 91 U/L (ref 38–126)
ALT SERPL W/O P-5'-P-CCNC: 15 U/L (ref 11–66)
ANION GAP SERPL CALC-SCNC: 11 MEQ/L (ref 8–16)
AST SERPL-CCNC: 18 U/L (ref 5–40)
BASOPHILS ABSOLUTE: 0.1 THOU/MM3 (ref 0–0.1)
BASOPHILS NFR BLD AUTO: 0.6 %
BILIRUB SERPL-MCNC: 0.7 MG/DL (ref 0.3–1.2)
BUN SERPL-MCNC: 11 MG/DL (ref 7–22)
CALCIUM SERPL-MCNC: 9.1 MG/DL (ref 8.5–10.5)
CHLORIDE SERPL-SCNC: 105 MEQ/L (ref 98–111)
CHOLEST SERPL-MCNC: 161 MG/DL (ref 100–199)
CO2 SERPL-SCNC: 24 MEQ/L (ref 23–33)
CREAT SERPL-MCNC: 0.8 MG/DL (ref 0.4–1.2)
DEPRECATED RDW RBC AUTO: 41.4 FL (ref 35–45)
EOSINOPHIL NFR BLD AUTO: 2.8 %
EOSINOPHILS ABSOLUTE: 0.3 THOU/MM3 (ref 0–0.4)
ERYTHROCYTE [DISTWIDTH] IN BLOOD BY AUTOMATED COUNT: 12.6 % (ref 11.5–14.5)
GFR SERPL CREATININE-BSD FRML MDRD: 79 ML/MIN/1.73M2
GLUCOSE SERPL-MCNC: 181 MG/DL (ref 70–108)
HCT VFR BLD AUTO: 44.4 % (ref 37–47)
HDLC SERPL-MCNC: 47 MG/DL
HGB BLD-MCNC: 14.6 GM/DL (ref 12–16)
IMM GRANULOCYTES # BLD AUTO: 0.03 THOU/MM3 (ref 0–0.07)
IMM GRANULOCYTES NFR BLD AUTO: 0.3 %
LDLC SERPL CALC-MCNC: 90 MG/DL
LYMPHOCYTES ABSOLUTE: 1.6 THOU/MM3 (ref 1–4.8)
LYMPHOCYTES NFR BLD AUTO: 17.6 %
MAGNESIUM SERPL-MCNC: 1.7 MG/DL (ref 1.6–2.4)
MCH RBC QN AUTO: 30 PG (ref 26–33)
MCHC RBC AUTO-ENTMCNC: 32.9 GM/DL (ref 32.2–35.5)
MCV RBC AUTO: 91.2 FL (ref 81–99)
MONOCYTES ABSOLUTE: 0.7 THOU/MM3 (ref 0.4–1.3)
MONOCYTES NFR BLD AUTO: 8 %
NEUTROPHILS ABSOLUTE: 6.6 THOU/MM3 (ref 1.8–7.7)
NEUTROPHILS NFR BLD AUTO: 70.7 %
NRBC BLD AUTO-RTO: 0 /100 WBC
PLATELET # BLD AUTO: 302 THOU/MM3 (ref 130–400)
PMV BLD AUTO: 10.7 FL (ref 9.4–12.4)
POTASSIUM SERPL-SCNC: 4.2 MEQ/L (ref 3.5–5.2)
PROT SERPL-MCNC: 6.7 G/DL (ref 6.1–8)
RBC # BLD AUTO: 4.87 MILL/MM3 (ref 4.2–5.4)
SODIUM SERPL-SCNC: 140 MEQ/L (ref 135–145)
TRIGL SERPL-MCNC: 122 MG/DL (ref 0–199)
VIT B12 SERPL-MCNC: 292 PG/ML (ref 211–911)
WBC # BLD AUTO: 9.3 THOU/MM3 (ref 4.8–10.8)

## 2024-09-17 PROCEDURE — 82607 VITAMIN B-12: CPT

## 2024-09-17 PROCEDURE — 80053 COMPREHEN METABOLIC PANEL: CPT

## 2024-09-17 PROCEDURE — 80061 LIPID PANEL: CPT

## 2024-09-17 PROCEDURE — 36415 COLL VENOUS BLD VENIPUNCTURE: CPT

## 2024-09-17 PROCEDURE — 83735 ASSAY OF MAGNESIUM: CPT

## 2024-09-17 PROCEDURE — 85025 COMPLETE CBC W/AUTO DIFF WBC: CPT

## 2024-09-26 ENCOUNTER — OFFICE VISIT (OUTPATIENT)
Dept: FAMILY MEDICINE CLINIC | Age: 69
End: 2024-09-26
Payer: MEDICARE

## 2024-09-26 VITALS
WEIGHT: 244.13 LBS | OXYGEN SATURATION: 96 % | HEART RATE: 71 BPM | HEIGHT: 64 IN | RESPIRATION RATE: 17 BRPM | DIASTOLIC BLOOD PRESSURE: 68 MMHG | SYSTOLIC BLOOD PRESSURE: 146 MMHG | BODY MASS INDEX: 41.68 KG/M2

## 2024-09-26 DIAGNOSIS — E03.9 ACQUIRED HYPOTHYROIDISM: ICD-10-CM

## 2024-09-26 DIAGNOSIS — E11.65 TYPE 2 DIABETES MELLITUS WITH HYPERGLYCEMIA, WITHOUT LONG-TERM CURRENT USE OF INSULIN (HCC): Primary | ICD-10-CM

## 2024-09-26 DIAGNOSIS — R21 FACIAL RASH: ICD-10-CM

## 2024-09-26 DIAGNOSIS — F33.42 RECURRENT MAJOR DEPRESSIVE DISORDER, IN FULL REMISSION (HCC): ICD-10-CM

## 2024-09-26 DIAGNOSIS — F41.1 GENERALIZED ANXIETY DISORDER: ICD-10-CM

## 2024-09-26 LAB — HBA1C MFR BLD: 7.9 %

## 2024-09-26 PROCEDURE — 1090F PRES/ABSN URINE INCON ASSESS: CPT | Performed by: FAMILY MEDICINE

## 2024-09-26 PROCEDURE — G8417 CALC BMI ABV UP PARAM F/U: HCPCS | Performed by: FAMILY MEDICINE

## 2024-09-26 PROCEDURE — 3078F DIAST BP <80 MM HG: CPT | Performed by: FAMILY MEDICINE

## 2024-09-26 PROCEDURE — 1036F TOBACCO NON-USER: CPT | Performed by: FAMILY MEDICINE

## 2024-09-26 PROCEDURE — 3077F SYST BP >= 140 MM HG: CPT | Performed by: FAMILY MEDICINE

## 2024-09-26 PROCEDURE — G8427 DOCREV CUR MEDS BY ELIG CLIN: HCPCS | Performed by: FAMILY MEDICINE

## 2024-09-26 PROCEDURE — 3017F COLORECTAL CA SCREEN DOC REV: CPT | Performed by: FAMILY MEDICINE

## 2024-09-26 PROCEDURE — 83036 HEMOGLOBIN GLYCOSYLATED A1C: CPT | Performed by: FAMILY MEDICINE

## 2024-09-26 PROCEDURE — 3051F HG A1C>EQUAL 7.0%<8.0%: CPT | Performed by: FAMILY MEDICINE

## 2024-09-26 PROCEDURE — G8399 PT W/DXA RESULTS DOCUMENT: HCPCS | Performed by: FAMILY MEDICINE

## 2024-09-26 PROCEDURE — 2022F DILAT RTA XM EVC RTNOPTHY: CPT | Performed by: FAMILY MEDICINE

## 2024-09-26 PROCEDURE — 1123F ACP DISCUSS/DSCN MKR DOCD: CPT | Performed by: FAMILY MEDICINE

## 2024-09-26 PROCEDURE — 99214 OFFICE O/P EST MOD 30 MIN: CPT | Performed by: FAMILY MEDICINE

## 2024-09-26 RX ORDER — PAROXETINE 40 MG/1
40 TABLET, FILM COATED ORAL EVERY MORNING
Qty: 90 TABLET | Refills: 3 | Status: SHIPPED | OUTPATIENT
Start: 2024-09-26

## 2024-09-26 RX ORDER — THYROID 90 MG/1
90 TABLET ORAL DAILY
Qty: 90 TABLET | Refills: 3 | Status: SHIPPED | OUTPATIENT
Start: 2024-09-26 | End: 2025-09-26

## 2024-09-26 RX ORDER — ERYTHROMYCIN 5 MG/G
OINTMENT OPHTHALMIC
Qty: 3.5 G | Refills: 1 | Status: SHIPPED | OUTPATIENT
Start: 2024-09-26 | End: 2024-10-06

## 2024-09-26 RX ORDER — PAROXETINE 10 MG/1
10 TABLET, FILM COATED ORAL DAILY
Qty: 90 TABLET | Refills: 3 | Status: SHIPPED | OUTPATIENT
Start: 2024-09-26

## 2024-09-26 NOTE — PROGRESS NOTES
Nury Wong (:  1955) is a 69 y.o. female,Established patient, here for evaluation of the following chief complaint(s):  Diabetes (Not monitoring at home) and Anxiety (Would like discuss increasing paxil)      Assessment & Plan   ASSESSMENT/PLAN:  1. Type 2 diabetes mellitus with hyperglycemia, without long-term current use of insulin (HCC)  -     POCT glycosylated hemoglobin (Hb A1C)  2. Acquired hypothyroidism  -     thyroid (NP THYROID) 90 MG tablet; Take 1 tablet by mouth daily, Disp-90 tablet, R-3Print  3. Recurrent major depressive disorder, in full remission (HCC)  -     PARoxetine (PAXIL) 40 MG tablet; Take 1 tablet by mouth every morning, Disp-90 tablet, R-3Normal  -     PARoxetine (PAXIL) 10 MG tablet; Take 1 tablet by mouth daily, Disp-90 tablet, R-3Normal  4. Generalized anxiety disorder  -     PARoxetine (PAXIL) 40 MG tablet; Take 1 tablet by mouth every morning, Disp-90 tablet, R-3Normal  -     PARoxetine (PAXIL) 10 MG tablet; Take 1 tablet by mouth daily, Disp-90 tablet, R-3Normal  5. Facial rash  -     erythromycin (ROMYCIN) 5 MG/GM ophthalmic ointment; Apply BID to facial rash, Disp-3.5 g, R-1, Normal    Diabetes is better but not well-controlled.  Discussed reasons for desiring control.  Encourage patient to get rid of pop and to replace with something better.  Blood pressure is not well-controlled either.  Patient blames on stress.  She does not want to increase her blood pressure as other places blood pressure is better.  Patient to monitor at home.  Getting rid of extra sugar in the pop along with the added salt may be helpful.  Counseling and validation given here.  Patient would benefit from counseling and marital counseling would also be helpful.  The situation does not seem to be open to this.  She request increasing her Paxil.  We will try this.  Will try erythromycin for her facial rash at this time.    Echo is due - f/u moderate aortic stenosis.     Return in about 3 months

## 2024-09-27 NOTE — TELEPHONE ENCOUNTER
Lauren Aguilera is requesting a refill on the following medications:  Requested Prescriptions     Pending Prescriptions Disp Refills    thyroid (ARMOUR THYROID) 30 MG tablet 30 tablet 3     Sig: Add 30 mg orally Mon, Wed, Fri to your [place] thyroid 90 mg daily       Date of last visit: 9/7/2022  Date of next visit (if applicable):3/9/2023  Date of last refill: 9/7/2022  Pharmacy Name: Jeanine Reddy LPN right

## 2024-10-08 ENCOUNTER — TELEPHONE (OUTPATIENT)
Dept: FAMILY MEDICINE CLINIC | Age: 69
End: 2024-10-08

## 2024-10-08 NOTE — TELEPHONE ENCOUNTER
----- Message from Dr. Monique Tejada MD sent at 9/30/2024  5:43 AM EDT -----  Please help patient arranged echo follow up for her aortic stenosis.  It's been ordered since June but it's time to get it scheduled.  Thank you.

## 2024-10-11 NOTE — TELEPHONE ENCOUNTER
Advised patient she is due for ECHO and asked if she needed any assistance getting it scheduled    She stated she will not be having the test done  She is still trying to pay the bill from the last ECHO    Education given to patient regarding aortic stenosis and the importance of having ECHO    She declined

## 2024-10-11 NOTE — TELEPHONE ENCOUNTER
I appreciate education given to patient.  Sorry to hear of her financial limitations.    See if care coordinator would have any ideas.

## 2024-10-21 ENCOUNTER — PATIENT MESSAGE (OUTPATIENT)
Dept: FAMILY MEDICINE CLINIC | Age: 69
End: 2024-10-21

## 2024-10-21 DIAGNOSIS — E11.65 TYPE 2 DIABETES MELLITUS WITH HYPERGLYCEMIA, WITHOUT LONG-TERM CURRENT USE OF INSULIN (HCC): ICD-10-CM

## 2024-10-21 NOTE — TELEPHONE ENCOUNTER
Nury Wong called requesting a refill on the following medications:  Requested Prescriptions     Refused Prescriptions Disp Refills    blood glucose monitor kit and supplies 1 kit 0     Sig: Dispense sufficient amount for indicated testing frequency plus additional to accommodate PRN testing needs. Dispense all needed supplies to include: monitor, strips, lancing device, lancets, control solutions, alcohol swabs.       Date of last visit: 9/26/2024  Date of next visit (if applicable):Visit date not found      Thanks,  Shannon Mock LPN

## 2024-10-23 ENCOUNTER — TELEPHONE (OUTPATIENT)
Dept: FAMILY MEDICINE CLINIC | Age: 69
End: 2024-10-23

## 2024-10-23 NOTE — TELEPHONE ENCOUNTER
Received fax requesting prior authorization for OneTouch Ultra Strips.    Key: [id number]    PA Submitted to CoverMyMeds.

## 2024-10-24 ENCOUNTER — PATIENT MESSAGE (OUTPATIENT)
Dept: FAMILY MEDICINE CLINIC | Age: 69
End: 2024-10-24

## 2024-10-24 DIAGNOSIS — E11.65 TYPE 2 DIABETES MELLITUS WITH HYPERGLYCEMIA, WITHOUT LONG-TERM CURRENT USE OF INSULIN (HCC): Primary | ICD-10-CM

## 2024-10-25 RX ORDER — LANCETS 30 GAUGE
1 EACH MISCELLANEOUS DAILY
Qty: 100 EACH | Refills: 5 | Status: SHIPPED | OUTPATIENT
Start: 2024-10-25

## 2024-10-25 RX ORDER — GLUCOSAMINE HCL/CHONDROITIN SU 500-400 MG
CAPSULE ORAL
Qty: 100 STRIP | Refills: 1 | Status: SHIPPED | OUTPATIENT
Start: 2024-10-25

## 2024-10-27 DIAGNOSIS — F41.1 GENERALIZED ANXIETY DISORDER: ICD-10-CM

## 2024-10-28 RX ORDER — BUSPIRONE HYDROCHLORIDE 15 MG/1
TABLET ORAL
Qty: 270 TABLET | Refills: 3 | Status: SHIPPED | OUTPATIENT
Start: 2024-10-28

## 2024-10-28 NOTE — TELEPHONE ENCOUNTER
Nury Wong called requesting a refill on the following medications:  Requested Prescriptions     Pending Prescriptions Disp Refills    busPIRone (BUSPAR) 15 MG tablet [Pharmacy Med Name: busPIRone HCl Oral Tablet 15 MG] 270 tablet 3     Sig: TAKE 1 TABLET THREE TIMES DAILY       Date of last visit: 9/26/2024  Date of next visit (if applicable):1/6/2025  Date of last refill: 8/15/24  Pharmacy Name: María Cam LPN

## 2024-12-23 NOTE — PROGRESS NOTES
Medicare Annual Wellness Visit  Name: Layo Lau Date: 3/1/2021   MRN: [id number] Sex: Female   Age: 76 y.o. Ethnicity: Non-/Non    : 1955 Race: Ilana Lowe is here for Medicare AWV (see list )    Screenings for behavioral, psychosocial and functional/safety risks, and cognitive dysfunction are all negative except as indicated below. These results, as well as other patient data from the [address] form, are documented in Flowsheets linked to this Encounter. Chief Complaint   Patient presents with    Medicare AWV     see list      -- fell recently. 2021, seen in ER. Knee pain and hip pain. Xray of hip reviewed/discussed   -- will see eye doctor in April, she is doing vitamins daily per eye doctor  -- knee brace, left. Has given out before, led to fall. Hypothyroidism reviewed. Allergies   Allergen Reactions    Ceclor [Cefaclor] Diarrhea    Cleocin [Clindamycin Hcl] Diarrhea    Penicillins Nausea Only     Stomach pain like ulcer pain    Sulfa Antibiotics Diarrhea    Vibramycin [Doxycycline Calcium] Diarrhea         Prior to Visit Medications    Medication Sig Taking? Authorizing Provider   Multiple Vitamins-Minerals ([facility] ADULT 50+ PO) Take by mouth daily Yes Historical Provider, MD   levothyroxine (SYNTHROID) 175 MCG tablet 1 po daily 6 days except 1 day do 1/2 tablet Yes Lucho Yeager MD   ibuprofen (ADVIL;MOTRIN) 600 MG tablet Take 1 tablet by mouth 3 times daily as needed for Pain Yes Lucho Yeager MD   blood glucose test strips (ASCENSIA AUTODISC VI;ONE TOUCH ULTRA TEST VI) strip One Touch Ultra 2 testing strips. Use once daily.  DX:R73.09 Yes Lucho Yeager MD   PARoxetine (PAXIL) 40 MG tablet Take 1 tablet by mouth every morning Yes Lucho Yeager MD   busPIRone (BUSPAR) 10 MG tablet Take 1 tablet by mouth 3 times daily Yes Lucho Yeager MD   metFORMIN (GLUCOPHAGE) 500 MG tablet Take 1 tablet by mouth 2 times daily (with meals) Yes Santiago York MD   Blood Glucose Monitoring Suppl (ONE TOUCH ULTRA 2) w/Device KIT One Touch Ultra 2 glucometer. Tests once daily. DX:R73.09 Yes Chidi Merrill MD   ONE TOUCH LANCETS MISC One Touch Lancets. Uses once daily. DX:R73.09 Yes Chidi Merrill MD         Past Medical History:   Diagnosis Date    Depression     Endometrial cancer ([address]     Hypothyroidism     Iron deficiency anemia 02/2002 to 12/2002    Irritable bowel syndrome     Lactose intolerance 2000    Murmur     Neurodermatitis 07/2000    with exzema    Prediabetes        Past Surgical History:   Procedure Laterality Date    CYST REMOVAL  1977    left ovary     DENTAL SURGERY  unsure of date    [facility] DENTAL SURGERY  03/01/2017    dental extractions     ENDOSCOPY, COLON, DIAGNOSTIC  7/24/14    Dr. Lacey Hodge  11/2010    Dr. Mary Kay Ellsworth    left    [address]   11/2010    Dr. Frannie Guerra         Family History   Problem Relation Age of Onset    Alzheimer's Disease Mother     Cancer Mother         breast    High Blood Pressure Mother     Cancer Father         lung    Cancer Sister         breast    ADHD Sister     Diabetes Maternal Grandmother     Heart Disease Sister         CABG    Stroke Neg Hx        CareTeam (Including outside providers/suppliers regularly involved in providing care):   Patient Care Team:  Sharron Snow MD as PCP - General (Family Medicine)  Sharron Snow MD as PCP - [facility] Empaneled Provider    Wt Readings from Last 3 Encounters:   03/01/21 246 lb (111.6 kg)   02/04/21 246 lb (111.6 kg)   12/02/20 244 lb (110.7 kg)     Vitals:    03/01/21 0827   BP: (!) 152/72   Site: Left Upper Arm   Position: Sitting   Cuff Size: Large Adult   Pulse: 84   Temp: 97.9 °F (36.6 °C)   SpO2: 99%   Weight: 246 lb (111.6 kg)   Height: 5' 4\" (1.626 m)     Body mass index is 42.23 kg/m².     Based upon direct observation of the patient, evaluation of cognition reveals recent and remote memory intact. General Appearance: alert and oriented to person, place and time, well-developed and well-nourished, in no acute distress  Pulmonary/Chest: clear to auscultation bilaterally- no wheezes, rales or rhonchi, normal air movement, no respiratory distress  Cardiovascular: normal rate and normal S1 and S2  Extremities: no cyanosis, clubbing or edema. Stiff knee but not tender on the left. No left knee xray     Patient's complete Health Risk Assessment and screening values have been reviewed and are found in Flowsheets. The following problems were reviewed today and where indicated follow up appointments were made and/or referrals ordered. Positive Risk Factor Screenings with Interventions:            General Health and ACP:  General  In general, how would you say your health is?: Good  In the past 7 days, have you experienced any of the following?  New or Increased Pain, New or Increased Fatigue, Loneliness, Social Isolation, Stress or Anger?: None of These  Do you get the social and emotional support that you need?: Yes  Do you have a Living Will?: Yes  Advance Directives     Power of [address] [place] [address] Will ACP-Advance Directive ACP-Power of     Not on File Not on File Not on File Not on File      General Health Risk Interventions:  · addressing stressors as best as possible    Health Habits/Nutrition:  Health Habits/Nutrition  Do you exercise for at least 20 minutes 2-3 times per week?: Yes  Have you lost any weight without trying in the past 3 months?: (!) Yes  Do you eat only one meal per day?: No  Have you seen the dentist within the past year?: (!) No  Body mass index: (!) 42.22  Health Habits/Nutrition Interventions:  · Nutritional issues:  educational materials for healthy, well-balanced diet provided  · Dental exam overdue:  patient encouraged to make appointment with his/her dentist       Personalized Preventive Plan   Current Health Maintenance Status  Immunization History   Administered Date(s) Administered    COVID-19, Pfizer, PF, 30mcg/0.3mL 02/24/2021    Influenza Vaccine, unspecified formulation 10/15/2018    Influenza Virus Vaccine 09/25/2020    Influenza, Triv, inactivated, subunit, adjuvanted, IM (Fluad 65 yrs and older) 10/07/2019    Pneumococcal Conjugate 13-valent ([id number]) 09/16/2019    Pneumococcal Polysaccharide ([id number]) 12/02/2020    Zoster Recombinant (Shingrix) 01/22/2019, 03/18/2019        Health Maintenance   Topic Date Due    Diabetic retinal exam  09/04/1964    DTaP/Tdap/Td vaccine (1 - Tdap) 09/04/1973    DEXA (modify frequency per FRAX score)  09/04/2009    Breast cancer screen  02/08/2019    Annual Wellness Visit (AWV)  09/06/2020    COVID-19 Vaccine (2 of 2 - Pfizer series) 03/17/2021    Lipid screen  08/26/2021    Diabetic foot exam  08/31/2021    A1C test (Diabetic or Prediabetic)  12/07/2021    Diabetic microalbuminuria test  12/07/2021    TSH testing  02/24/2022    Colon cancer screen colonoscopy  07/24/2024    Flu vaccine  Completed    Shingles Vaccine  Completed    Pneumococcal 65+ years Vaccine  Completed    Hepatitis C screen  Completed    Hepatitis A vaccine  Aged Out    Hib vaccine  Aged Out    Meningococcal (ACWY) vaccine  Aged Out     Recommendations for [employer] Due: see orders and patient instructions/AVS.  . Recommended screening schedule for the next 5-10 years is provided to the patient in written form: see Patient Instructions/AVS.    [facility] was seen today for medicare awv. Diagnoses and all orders for this visit:    Routine general medical examination at a health care facility    Osteoporosis screening  -     DEXA BONE DENSITY AXIAL SKELETON; Future    Post-menopausal  -     DEXA BONE DENSITY AXIAL SKELETON; Future    Encounter for screening mammogram for breast cancer  -     JESUS Digital Screen Bilateral [RMC6336];  Future    Major depressive disorder with single episode, in full remission ([facility] [address]  -     TSH with Reflex; Future  -     Vitamin B12; Future  -     Magnesium; Future  -     Lipid Panel; Future  -     Comprehensive Metabolic Panel; Future  -     CBC Auto Differential; Future    Type 2 diabetes mellitus with hyperglycemia, without long-term current use of insulin (HCC)  -     TSH with Reflex; Future  -     Vitamin B12; Future  -     Magnesium; Future  -     Lipid Panel; Future  -     Comprehensive Metabolic Panel; Future  -     CBC Auto Differential; Future  -     Hemoglobin A1C; Future    Morbidly obese (HCC)  -     TSH with Reflex; Future  -     Vitamin B12; Future  -     Magnesium; Future  -     Lipid Panel; Future  -     Comprehensive Metabolic Panel; Future  -     CBC Auto Differential; Future    Chronic pain of left knee  -     Brace    Acquired hypothyroidism  -     levothyroxine (SYNTHROID) 175 MCG tablet; 1 po daily 6 days except 1 day do 1/2 tablet  -     TSH with Reflex; Future  -     Vitamin B12; Future  -     Magnesium; Future  -     Lipid Panel; Future  -     Comprehensive Metabolic Panel; Future  -     CBC Auto Differential; Future             Return in 6 months (on 9/1/2021) for medical review. Yes

## 2025-01-06 ENCOUNTER — HOSPITAL ENCOUNTER (OUTPATIENT)
Age: 70
Discharge: HOME | End: 2025-01-06
Payer: MEDICARE

## 2025-01-06 ENCOUNTER — OFFICE VISIT (OUTPATIENT)
Dept: FAMILY MEDICINE CLINIC | Age: 70
End: 2025-01-06
Payer: MEDICARE

## 2025-01-06 VITALS
WEIGHT: 240 LBS | SYSTOLIC BLOOD PRESSURE: 138 MMHG | BODY MASS INDEX: 40.97 KG/M2 | RESPIRATION RATE: 18 BRPM | HEART RATE: 68 BPM | OXYGEN SATURATION: 98 % | DIASTOLIC BLOOD PRESSURE: 68 MMHG | HEIGHT: 64 IN

## 2025-01-06 DIAGNOSIS — H61.893 IRRITATION OF EXTERNAL EAR CANAL, BILATERAL: ICD-10-CM

## 2025-01-06 DIAGNOSIS — E53.8 B12 DEFICIENCY: ICD-10-CM

## 2025-01-06 DIAGNOSIS — R21 FACIAL RASH: ICD-10-CM

## 2025-01-06 DIAGNOSIS — I10 PRIMARY HYPERTENSION: ICD-10-CM

## 2025-01-06 DIAGNOSIS — E03.9 ACQUIRED HYPOTHYROIDISM: ICD-10-CM

## 2025-01-06 DIAGNOSIS — E11.65 TYPE 2 DIABETES MELLITUS WITH HYPERGLYCEMIA, WITHOUT LONG-TERM CURRENT USE OF INSULIN (HCC): ICD-10-CM

## 2025-01-06 DIAGNOSIS — F41.9 ANXIETY: ICD-10-CM

## 2025-01-06 DIAGNOSIS — I35.0 MODERATE AORTIC STENOSIS: ICD-10-CM

## 2025-01-06 DIAGNOSIS — Z91.199 NON-COMPLIANCE: ICD-10-CM

## 2025-01-06 DIAGNOSIS — F33.41 RECURRENT MAJOR DEPRESSIVE DISORDER, IN PARTIAL REMISSION (HCC): ICD-10-CM

## 2025-01-06 DIAGNOSIS — F33.1 MODERATE EPISODE OF RECURRENT MAJOR DEPRESSIVE DISORDER (HCC): ICD-10-CM

## 2025-01-06 DIAGNOSIS — E11.65 TYPE 2 DIABETES MELLITUS WITH HYPERGLYCEMIA, WITHOUT LONG-TERM CURRENT USE OF INSULIN (HCC): Primary | ICD-10-CM

## 2025-01-06 DIAGNOSIS — G47.00 INSOMNIA, UNSPECIFIED TYPE: ICD-10-CM

## 2025-01-06 DIAGNOSIS — Z12.11 SCREEN FOR COLON CANCER: ICD-10-CM

## 2025-01-06 LAB
DEPRECATED MEAN GLUCOSE BLD GHB EST-ACNC: 177 MG/DL (ref 70–126)
FOLATE SERPL-MCNC: 8.5 NG/ML (ref 4.8–24.2)
HBA1C MFR BLD HPLC: 7.9 % (ref 4.4–6.4)
VIT B12 SERPL-MCNC: 272 PG/ML (ref 211–911)

## 2025-01-06 PROCEDURE — 82607 VITAMIN B-12: CPT

## 2025-01-06 PROCEDURE — 1123F ACP DISCUSS/DSCN MKR DOCD: CPT | Performed by: FAMILY MEDICINE

## 2025-01-06 PROCEDURE — 83036 HEMOGLOBIN GLYCOSYLATED A1C: CPT

## 2025-01-06 PROCEDURE — G8399 PT W/DXA RESULTS DOCUMENT: HCPCS | Performed by: FAMILY MEDICINE

## 2025-01-06 PROCEDURE — G8427 DOCREV CUR MEDS BY ELIG CLIN: HCPCS | Performed by: FAMILY MEDICINE

## 2025-01-06 PROCEDURE — 2022F DILAT RTA XM EVC RTNOPTHY: CPT | Performed by: FAMILY MEDICINE

## 2025-01-06 PROCEDURE — 1160F RVW MEDS BY RX/DR IN RCRD: CPT | Performed by: FAMILY MEDICINE

## 2025-01-06 PROCEDURE — 1036F TOBACCO NON-USER: CPT | Performed by: FAMILY MEDICINE

## 2025-01-06 PROCEDURE — 3075F SYST BP GE 130 - 139MM HG: CPT | Performed by: FAMILY MEDICINE

## 2025-01-06 PROCEDURE — 1090F PRES/ABSN URINE INCON ASSESS: CPT | Performed by: FAMILY MEDICINE

## 2025-01-06 PROCEDURE — G8417 CALC BMI ABV UP PARAM F/U: HCPCS | Performed by: FAMILY MEDICINE

## 2025-01-06 PROCEDURE — 3078F DIAST BP <80 MM HG: CPT | Performed by: FAMILY MEDICINE

## 2025-01-06 PROCEDURE — 3017F COLORECTAL CA SCREEN DOC REV: CPT | Performed by: FAMILY MEDICINE

## 2025-01-06 PROCEDURE — 36415 COLL VENOUS BLD VENIPUNCTURE: CPT

## 2025-01-06 PROCEDURE — M1308 PR FLU IMMUNIZE NO ADMIN: HCPCS | Performed by: FAMILY MEDICINE

## 2025-01-06 PROCEDURE — 1159F MED LIST DOCD IN RCRD: CPT | Performed by: FAMILY MEDICINE

## 2025-01-06 PROCEDURE — 3046F HEMOGLOBIN A1C LEVEL >9.0%: CPT | Performed by: FAMILY MEDICINE

## 2025-01-06 PROCEDURE — 99214 OFFICE O/P EST MOD 30 MIN: CPT | Performed by: FAMILY MEDICINE

## 2025-01-06 PROCEDURE — 82746 ASSAY OF FOLIC ACID SERUM: CPT

## 2025-01-06 RX ORDER — NEOMYCIN SULFATE, POLYMYXIN B SULFATE, HYDROCORTISONE 3.5; 10000; 1 MG/ML; [USP'U]/ML; MG/ML
4 SOLUTION/ DROPS AURICULAR (OTIC) 3 TIMES DAILY
Qty: 10 ML | Refills: 0 | Status: SHIPPED | OUTPATIENT
Start: 2025-01-06 | End: 2025-01-16

## 2025-01-06 RX ORDER — TRAZODONE HYDROCHLORIDE 50 MG/1
50 TABLET, FILM COATED ORAL NIGHTLY
Qty: 90 TABLET | Refills: 1 | Status: SHIPPED | OUTPATIENT
Start: 2025-01-06

## 2025-01-06 RX ORDER — MUPIROCIN 20 MG/G
OINTMENT TOPICAL
Qty: 15 G | Refills: 0 | Status: SHIPPED | OUTPATIENT
Start: 2025-01-06 | End: 2025-01-13

## 2025-01-06 ASSESSMENT — PATIENT HEALTH QUESTIONNAIRE - PHQ9
SUM OF ALL RESPONSES TO PHQ QUESTIONS 1-9: 8
5. POOR APPETITE OR OVEREATING: SEVERAL DAYS
9. THOUGHTS THAT YOU WOULD BE BETTER OFF DEAD, OR OF HURTING YOURSELF: NOT AT ALL
8. MOVING OR SPEAKING SO SLOWLY THAT OTHER PEOPLE COULD HAVE NOTICED. OR THE OPPOSITE, BEING SO FIGETY OR RESTLESS THAT YOU HAVE BEEN MOVING AROUND A LOT MORE THAN USUAL: NOT AT ALL
2. FEELING DOWN, DEPRESSED OR HOPELESS: NEARLY EVERY DAY
10. IF YOU CHECKED OFF ANY PROBLEMS, HOW DIFFICULT HAVE THESE PROBLEMS MADE IT FOR YOU TO DO YOUR WORK, TAKE CARE OF THINGS AT HOME, OR GET ALONG WITH OTHER PEOPLE: NOT DIFFICULT AT ALL
SUM OF ALL RESPONSES TO PHQ QUESTIONS 1-9: 8
4. FEELING TIRED OR HAVING LITTLE ENERGY: SEVERAL DAYS
6. FEELING BAD ABOUT YOURSELF - OR THAT YOU ARE A FAILURE OR HAVE LET YOURSELF OR YOUR FAMILY DOWN: NOT AT ALL
3. TROUBLE FALLING OR STAYING ASLEEP: NEARLY EVERY DAY
7. TROUBLE CONCENTRATING ON THINGS, SUCH AS READING THE NEWSPAPER OR WATCHING TELEVISION: NOT AT ALL
SUM OF ALL RESPONSES TO PHQ9 QUESTIONS 1 & 2: 3
1. LITTLE INTEREST OR PLEASURE IN DOING THINGS: NOT AT ALL

## 2025-01-06 ASSESSMENT — ENCOUNTER SYMPTOMS: SHORTNESS OF BREATH: 0

## 2025-01-06 NOTE — PROGRESS NOTES
Nury Wong (:  1955) is a 69 y.o. female,Established patient, here for evaluation of the following chief complaint(s):  Diabetes      Assessment & Plan   ASSESSMENT/PLAN:  1. Type 2 diabetes mellitus with hyperglycemia, without long-term current use of insulin (HCC)  -     Hemoglobin A1C; Future  -      DIABETES FOOT EXAM  2. Moderate episode of recurrent major depressive disorder (HCC)  3. Primary hypertension  4. Recurrent major depressive disorder, in partial remission (HCC)  5. Moderate aortic stenosis -- 3/2022, 2023. monitor yearly  6. Acquired hypothyroidism  7. Non-compliance  8. Anxiety  -     Vitamin B12 & Folate; Future  9. Insomnia, unspecified type  -     traZODone (DESYREL) 50 MG tablet; Take 1 tablet by mouth nightly, Disp-90 tablet, R-1Normal  10. B12 deficiency  -     Vitamin B12 & Folate; Future  11. Facial rash  -     mupirocin (BACTROBAN) 2 % ointment; Apply topically 3 times daily for up to 2 weeks., Disp-15 g, R-0, Normal  12. Irritation of external ear canal, bilateral  -     neomycin-polymyxin-hydrocortisone (CORTISPORIN) 3.5-75984-4 otic solution; Place 4 drops into both ears 3 times daily for 10 days Instill into bilateral Ear, Disp-10 mL, R-0Normal  13. Screen for colon cancer  -     Fecal DNA Colorectal cancer screening (Cologuard)    Assessment & Plan  1. Insomnia.  Her blood pressure and heart rate are within normal limits. She is currently on Paxil 40 mg in the morning and 10 mg as well, totaling 50 mg, and BuSpar 15 mg three times a day for anxiety and depression. A low dose of trazodone will be initiated to manage her insomnia. If effective, the dosage can be increased up to 300 mg. Cognitive behavioral therapy for insomnia is recommended, and she is encouraged to explore free online applications for this purpose.    2. Facial rash.  The facial rash appears to be dry, which may impede the healing process. She is advised to apply coconut oil to the affected area to

## 2025-01-06 NOTE — PATIENT INSTRUCTIONS
Check out CBT (cognitive behavioral therapy) for insomnia.     Goal of A1c < 7.0    Fasting blood glucose < 130 and   2 hours after eating < 180

## 2025-01-07 DIAGNOSIS — E53.8 B12 DEFICIENCY: Primary | ICD-10-CM

## 2025-01-07 DIAGNOSIS — I10 PRIMARY HYPERTENSION: ICD-10-CM

## 2025-01-07 DIAGNOSIS — E03.9 ACQUIRED HYPOTHYROIDISM: ICD-10-CM

## 2025-01-07 DIAGNOSIS — F33.1 MODERATE EPISODE OF RECURRENT MAJOR DEPRESSIVE DISORDER (HCC): ICD-10-CM

## 2025-01-07 NOTE — RESULT ENCOUNTER NOTE
Please let patient know that her B12 is again low.  Ruling out causes involves checking a MMA and intrinsic factor, which follow up blood work for her to do at her nearest convenience.  Besides preservision, does she take any other supplements?

## 2025-01-10 DIAGNOSIS — I10 ESSENTIAL HYPERTENSION: ICD-10-CM

## 2025-01-10 DIAGNOSIS — E11.65 TYPE 2 DIABETES MELLITUS WITH HYPERGLYCEMIA, WITHOUT LONG-TERM CURRENT USE OF INSULIN (HCC): ICD-10-CM

## 2025-01-13 RX ORDER — ATORVASTATIN CALCIUM 10 MG/1
10 TABLET, FILM COATED ORAL DAILY
Qty: 90 TABLET | Refills: 3 | Status: SHIPPED | OUTPATIENT
Start: 2025-01-13

## 2025-01-13 RX ORDER — IRBESARTAN 150 MG/1
150 TABLET ORAL DAILY
Qty: 90 TABLET | Refills: 3 | Status: SHIPPED | OUTPATIENT
Start: 2025-01-13

## 2025-01-13 NOTE — TELEPHONE ENCOUNTER
This medication refill is regarding a electronic request.  Refill requested by  [facility] pharmacy  .    Requested Prescriptions     Pending Prescriptions Disp Refills    atorvastatin (LIPITOR) 10 MG tablet [Pharmacy Med Name: Atorvastatin Calcium Oral Tablet 10 MG] 90 tablet 3     Sig: TAKE 1 TABLET EVERY DAY    irbesartan (AVAPRO) 150 MG tablet [Pharmacy Med Name: Irbesartan Oral Tablet 150 MG] 90 tablet 3     Sig: TAKE 1 TABLET EVERY DAY       Date of last visit: 1/6/2025  Date of next visit: 4/7/2025  Date of last refill: 3/26/24  Pharmacy Name: [facility] Lipid Panel:    Lab Results   Component Value Date/Time    CHOL 161 09/17/2024 10:07 AM    TRIG 122 09/17/2024 10:07 AM    HDL 47 09/17/2024 10:07 AM     Last CMP:   Lab Results   Component Value Date     09/17/2024    K 4.2 09/17/2024     09/17/2024    CO2 24 09/17/2024    BUN 11 09/17/2024    CREATININE 0.8 09/17/2024    GLUCOSE 181 (H) 09/17/2024    CALCIUM 9.1 09/17/2024    BILITOT 0.7 09/17/2024    ALKPHOS 91 09/17/2024    AST 18 09/17/2024    ALT 15 09/17/2024    LABGLOM 79 09/17/2024       Last Thyroid:    Lab Results   Component Value Date    TSH 1.300 09/04/2024    M9RTWGI 142 02/24/2021    FT3 7.24 (H) 12/07/2020    T4FREE 0.72 (L) 09/04/2024     Last Hemoglobin A1C:    Lab Results   Component Value Date/Time    LABA1C 7.9 01/06/2025 10:22 AM       Rx verified, ordered and set to EP.

## 2025-01-20 LAB — NONINV COLON CA DNA+OCC BLD SCRN STL QL: POSITIVE

## 2025-01-21 ENCOUNTER — TELEPHONE (OUTPATIENT)
Dept: FAMILY MEDICINE CLINIC | Age: 70
End: 2025-01-21

## 2025-01-21 DIAGNOSIS — R19.5 POSITIVE FIT (FECAL IMMUNOCHEMICAL TEST): Primary | ICD-10-CM

## 2025-01-21 NOTE — TELEPHONE ENCOUNTER
----- Message from Dr. Monique Tejada MD sent at 1/20/2025 10:12 PM EST -----  Please let patient know that her FIT test was positive.  Recommendation is for a colonoscopy -- does she have a preference for colonoscopy?

## 2025-02-12 ENCOUNTER — PATIENT MESSAGE (OUTPATIENT)
Dept: FAMILY MEDICINE CLINIC | Age: 70
End: 2025-02-12

## 2025-02-12 DIAGNOSIS — E03.9 ACQUIRED HYPOTHYROIDISM: Primary | ICD-10-CM

## 2025-02-20 ENCOUNTER — PATIENT MESSAGE (OUTPATIENT)
Dept: FAMILY MEDICINE CLINIC | Age: 70
End: 2025-02-20

## 2025-02-25 ENCOUNTER — OFFICE VISIT (OUTPATIENT)
Dept: FAMILY MEDICINE CLINIC | Age: 70
End: 2025-02-25
Payer: MEDICARE

## 2025-02-25 VITALS
SYSTOLIC BLOOD PRESSURE: 138 MMHG | HEART RATE: 70 BPM | BODY MASS INDEX: 41.23 KG/M2 | OXYGEN SATURATION: 95 % | DIASTOLIC BLOOD PRESSURE: 64 MMHG | RESPIRATION RATE: 16 BRPM | WEIGHT: 240.2 LBS

## 2025-02-25 DIAGNOSIS — R21 FACIAL RASH: Primary | ICD-10-CM

## 2025-02-25 PROCEDURE — 1123F ACP DISCUSS/DSCN MKR DOCD: CPT | Performed by: NURSE PRACTITIONER

## 2025-02-25 PROCEDURE — 1036F TOBACCO NON-USER: CPT | Performed by: NURSE PRACTITIONER

## 2025-02-25 PROCEDURE — 3075F SYST BP GE 130 - 139MM HG: CPT | Performed by: NURSE PRACTITIONER

## 2025-02-25 PROCEDURE — G8399 PT W/DXA RESULTS DOCUMENT: HCPCS | Performed by: NURSE PRACTITIONER

## 2025-02-25 PROCEDURE — 3017F COLORECTAL CA SCREEN DOC REV: CPT | Performed by: NURSE PRACTITIONER

## 2025-02-25 PROCEDURE — G8428 CUR MEDS NOT DOCUMENT: HCPCS | Performed by: NURSE PRACTITIONER

## 2025-02-25 PROCEDURE — 1090F PRES/ABSN URINE INCON ASSESS: CPT | Performed by: NURSE PRACTITIONER

## 2025-02-25 PROCEDURE — G8417 CALC BMI ABV UP PARAM F/U: HCPCS | Performed by: NURSE PRACTITIONER

## 2025-02-25 PROCEDURE — 99213 OFFICE O/P EST LOW 20 MIN: CPT | Performed by: NURSE PRACTITIONER

## 2025-02-25 PROCEDURE — 3078F DIAST BP <80 MM HG: CPT | Performed by: NURSE PRACTITIONER

## 2025-02-25 RX ORDER — PREDNISONE 20 MG/1
TABLET ORAL
Qty: 15 TABLET | Refills: 0 | Status: SHIPPED | OUTPATIENT
Start: 2025-02-25 | End: 2025-03-07

## 2025-02-25 SDOH — ECONOMIC STABILITY: FOOD INSECURITY: WITHIN THE PAST 12 MONTHS, YOU WORRIED THAT YOUR FOOD WOULD RUN OUT BEFORE YOU GOT MONEY TO BUY MORE.: NEVER TRUE

## 2025-02-25 SDOH — ECONOMIC STABILITY: FOOD INSECURITY: WITHIN THE PAST 12 MONTHS, THE FOOD YOU BOUGHT JUST DIDN'T LAST AND YOU DIDN'T HAVE MONEY TO GET MORE.: NEVER TRUE

## 2025-02-25 ASSESSMENT — ENCOUNTER SYMPTOMS: EYE PAIN: 0

## 2025-02-25 NOTE — PROGRESS NOTES
General: No focal deficit present.      Mental Status: She is alert. Mental status is at baseline.   Psychiatric:         Mood and Affect: Mood normal.         Behavior: Behavior is cooperative.                  An electronic signature was used to authenticate this note.    --ALEJANDRA HAMMOND, ANUSHA - CNP

## 2025-03-13 ENCOUNTER — TELEPHONE (OUTPATIENT)
Dept: FAMILY MEDICINE CLINIC | Age: 70
End: 2025-03-13

## 2025-03-13 DIAGNOSIS — E11.65 TYPE 2 DIABETES MELLITUS WITH HYPERGLYCEMIA, WITHOUT LONG-TERM CURRENT USE OF INSULIN (HCC): Primary | ICD-10-CM

## 2025-03-18 ENCOUNTER — PATIENT MESSAGE (OUTPATIENT)
Dept: FAMILY MEDICINE CLINIC | Age: 70
End: 2025-03-18

## 2025-03-18 NOTE — TELEPHONE ENCOUNTER
No additional labs needed.    It's slightly too early for new A1c due to not being 90 days. So let's plan to do in office.

## 2025-04-07 ENCOUNTER — OFFICE VISIT (OUTPATIENT)
Dept: FAMILY MEDICINE CLINIC | Age: 70
End: 2025-04-07
Payer: MEDICARE

## 2025-04-07 VITALS
BODY MASS INDEX: 40.68 KG/M2 | WEIGHT: 237 LBS | DIASTOLIC BLOOD PRESSURE: 50 MMHG | OXYGEN SATURATION: 95 % | SYSTOLIC BLOOD PRESSURE: 138 MMHG | RESPIRATION RATE: 18 BRPM | HEART RATE: 94 BPM

## 2025-04-07 DIAGNOSIS — L71.0 PERIORAL DERMATITIS: ICD-10-CM

## 2025-04-07 DIAGNOSIS — E11.65 TYPE 2 DIABETES MELLITUS WITH HYPERGLYCEMIA, WITHOUT LONG-TERM CURRENT USE OF INSULIN (HCC): Primary | ICD-10-CM

## 2025-04-07 DIAGNOSIS — H01.136 ECZEMATOUS DERMATITIS OF EYELIDS OF BOTH EYES, UNSPECIFIED EYELID: ICD-10-CM

## 2025-04-07 DIAGNOSIS — H01.133 ECZEMATOUS DERMATITIS OF EYELIDS OF BOTH EYES, UNSPECIFIED EYELID: ICD-10-CM

## 2025-04-07 DIAGNOSIS — E03.9 ACQUIRED HYPOTHYROIDISM: ICD-10-CM

## 2025-04-07 DIAGNOSIS — E53.8 LOW SERUM VITAMIN B12: ICD-10-CM

## 2025-04-07 DIAGNOSIS — R05.1 ACUTE COUGH: ICD-10-CM

## 2025-04-07 DIAGNOSIS — I10 PRIMARY HYPERTENSION: ICD-10-CM

## 2025-04-07 DIAGNOSIS — K21.9 GASTROESOPHAGEAL REFLUX DISEASE WITHOUT ESOPHAGITIS: ICD-10-CM

## 2025-04-07 LAB — HBA1C MFR BLD: 7.1 %

## 2025-04-07 PROCEDURE — 3017F COLORECTAL CA SCREEN DOC REV: CPT | Performed by: FAMILY MEDICINE

## 2025-04-07 PROCEDURE — 1036F TOBACCO NON-USER: CPT | Performed by: FAMILY MEDICINE

## 2025-04-07 PROCEDURE — 3078F DIAST BP <80 MM HG: CPT | Performed by: FAMILY MEDICINE

## 2025-04-07 PROCEDURE — 1159F MED LIST DOCD IN RCRD: CPT | Performed by: FAMILY MEDICINE

## 2025-04-07 PROCEDURE — 99214 OFFICE O/P EST MOD 30 MIN: CPT | Performed by: FAMILY MEDICINE

## 2025-04-07 PROCEDURE — 83036 HEMOGLOBIN GLYCOSYLATED A1C: CPT | Performed by: FAMILY MEDICINE

## 2025-04-07 PROCEDURE — 1123F ACP DISCUSS/DSCN MKR DOCD: CPT | Performed by: FAMILY MEDICINE

## 2025-04-07 PROCEDURE — 1090F PRES/ABSN URINE INCON ASSESS: CPT | Performed by: FAMILY MEDICINE

## 2025-04-07 PROCEDURE — G8399 PT W/DXA RESULTS DOCUMENT: HCPCS | Performed by: FAMILY MEDICINE

## 2025-04-07 PROCEDURE — 3051F HG A1C>EQUAL 7.0%<8.0%: CPT | Performed by: FAMILY MEDICINE

## 2025-04-07 PROCEDURE — G8417 CALC BMI ABV UP PARAM F/U: HCPCS | Performed by: FAMILY MEDICINE

## 2025-04-07 PROCEDURE — G8427 DOCREV CUR MEDS BY ELIG CLIN: HCPCS | Performed by: FAMILY MEDICINE

## 2025-04-07 PROCEDURE — 1160F RVW MEDS BY RX/DR IN RCRD: CPT | Performed by: FAMILY MEDICINE

## 2025-04-07 PROCEDURE — 3075F SYST BP GE 130 - 139MM HG: CPT | Performed by: FAMILY MEDICINE

## 2025-04-07 PROCEDURE — 2022F DILAT RTA XM EVC RTNOPTHY: CPT | Performed by: FAMILY MEDICINE

## 2025-04-07 RX ORDER — TRIAMCINOLONE ACETONIDE 0.25 MG/G
CREAM TOPICAL
Qty: 15 G | Refills: 1 | Status: SHIPPED | OUTPATIENT
Start: 2025-04-07

## 2025-04-07 ASSESSMENT — ENCOUNTER SYMPTOMS
COUGH: 1
SHORTNESS OF BREATH: 0

## 2025-04-07 NOTE — PATIENT INSTRUCTIONS
May use cetirizine or loratadine 10 mg orally over the counter once daily to help with mild congestion and cough, likely weather/allergies related.     Plenty of water intake    Mucinex 600 mg orally twice a day for 5-7 days.

## 2025-04-26 DIAGNOSIS — F33.42 RECURRENT MAJOR DEPRESSIVE DISORDER, IN FULL REMISSION: ICD-10-CM

## 2025-04-28 RX ORDER — LAMOTRIGINE 100 MG/1
100 TABLET ORAL 2 TIMES DAILY
Qty: 180 TABLET | Refills: 3 | Status: SHIPPED | OUTPATIENT
Start: 2025-04-28

## 2025-04-28 NOTE — TELEPHONE ENCOUNTER
Nury Wong called requesting a refill on the following medications:  Requested Prescriptions     Pending Prescriptions Disp Refills    lamoTRIgine (LAMICTAL) 100 MG tablet [Pharmacy Med Name: lamoTRIgine Oral Tablet 100 MG] 180 tablet 3     Sig: TAKE 1 TABLET TWICE DAILY       Date of last visit: 4/7/2025  Date of next visit (if applicable):10/17/2025  Date of last refill: 6/25/24  [facility] Name: [facility]

## 2025-04-30 ENCOUNTER — TELEPHONE (OUTPATIENT)
Dept: FAMILY MEDICINE CLINIC | Age: 70
End: 2025-04-30

## 2025-04-30 NOTE — TELEPHONE ENCOUNTER
----- Message from Dr. Monique Tejada MD sent at 4/7/2025 12:59 PM EDT -----  My apologies. I forgot to address vitamin B12 being low.  Please have her take vitamin B12 1000 mcg orally daily and then she will repeat B12 with the other labs ordered for October.

## 2025-05-10 DIAGNOSIS — E11.65 TYPE 2 DIABETES MELLITUS WITH HYPERGLYCEMIA, WITHOUT LONG-TERM CURRENT USE OF INSULIN (HCC): ICD-10-CM

## 2025-05-12 RX ORDER — GLIPIZIDE 5 MG/1
5 TABLET ORAL 2 TIMES DAILY
Qty: 180 TABLET | Refills: 1 | Status: SHIPPED | OUTPATIENT
Start: 2025-05-12

## 2025-05-12 NOTE — TELEPHONE ENCOUNTER
Nury Wong called requesting a refill on the following medications:  Requested Prescriptions     Pending Prescriptions Disp Refills    glipiZIDE (GLUCOTROL) 5 MG tablet [Pharmacy Med Name: glipiZIDE Oral Tablet 5 MG] 180 tablet 3     Sig: TAKE 1 TABLET TWICE DAILY       Date of last visit: 4/7/2025  Date of next visit (if applicable):10/17/2025  Date of last refill: 6/25/24  Pharmacy Name: [facility] mail delivery      Thanks,  Akosua Barros MA

## 2025-07-10 DIAGNOSIS — F41.1 GENERALIZED ANXIETY DISORDER: ICD-10-CM

## 2025-07-10 DIAGNOSIS — F33.42 RECURRENT MAJOR DEPRESSIVE DISORDER, IN FULL REMISSION: ICD-10-CM

## 2025-07-10 RX ORDER — PAROXETINE 10 MG/1
10 TABLET, FILM COATED ORAL DAILY
Qty: 90 TABLET | Refills: 1 | Status: SHIPPED | OUTPATIENT
Start: 2025-07-10

## 2025-07-10 NOTE — TELEPHONE ENCOUNTER
Nury Wong called requesting a refill on the following medications:  Requested Prescriptions     Pending Prescriptions Disp Refills    PARoxetine (PAXIL) 10 MG tablet [Pharmacy Med Name: PARoxetine HCl Oral Tablet 10 MG] 90 tablet 3     Sig: TAKE 1 TABLET EVERY DAY       Date of last visit: 4/7/2025  Date of next visit (if applicable):10/17/2025  Date of last refill: 9/26/24  Pharmacy Name: [facility] Goldsmith LPN

## 2025-08-10 DIAGNOSIS — F41.1 GENERALIZED ANXIETY DISORDER: ICD-10-CM

## 2025-08-10 DIAGNOSIS — F33.42 RECURRENT MAJOR DEPRESSIVE DISORDER, IN FULL REMISSION: ICD-10-CM

## 2025-08-11 RX ORDER — PAROXETINE 40 MG/1
40 TABLET, FILM COATED ORAL EVERY MORNING
Qty: 90 TABLET | Refills: 3 | Status: SHIPPED | OUTPATIENT
Start: 2025-08-11

## 2025-08-13 ENCOUNTER — HOSPITAL ENCOUNTER (OUTPATIENT)
Age: 70
Discharge: HOME | End: 2025-08-13
Payer: MEDICARE

## 2025-08-13 ENCOUNTER — OFFICE VISIT (OUTPATIENT)
Age: 70
End: 2025-08-13
Payer: MEDICARE

## 2025-08-13 ENCOUNTER — TELEPHONE (OUTPATIENT)
Dept: FAMILY MEDICINE CLINIC | Age: 70
End: 2025-08-13

## 2025-08-13 VITALS
WEIGHT: 244 LBS | HEIGHT: 63 IN | SYSTOLIC BLOOD PRESSURE: 152 MMHG | HEART RATE: 74 BPM | OXYGEN SATURATION: 95 % | DIASTOLIC BLOOD PRESSURE: 40 MMHG | BODY MASS INDEX: 43.23 KG/M2

## 2025-08-13 DIAGNOSIS — E03.9 ACQUIRED HYPOTHYROIDISM: ICD-10-CM

## 2025-08-13 DIAGNOSIS — E03.9 ACQUIRED HYPOTHYROIDISM: Primary | ICD-10-CM

## 2025-08-13 LAB
T4 FREE SERPL-MCNC: 0.7 NG/DL (ref 0.92–1.68)
TSH SERPL DL<=0.05 MIU/L-ACNC: 7.08 UIU/ML (ref 0.27–4.2)

## 2025-08-13 PROCEDURE — 1090F PRES/ABSN URINE INCON ASSESS: CPT | Performed by: INTERNAL MEDICINE

## 2025-08-13 PROCEDURE — 1036F TOBACCO NON-USER: CPT | Performed by: INTERNAL MEDICINE

## 2025-08-13 PROCEDURE — 1159F MED LIST DOCD IN RCRD: CPT | Performed by: INTERNAL MEDICINE

## 2025-08-13 PROCEDURE — G8417 CALC BMI ABV UP PARAM F/U: HCPCS | Performed by: INTERNAL MEDICINE

## 2025-08-13 PROCEDURE — G8399 PT W/DXA RESULTS DOCUMENT: HCPCS | Performed by: INTERNAL MEDICINE

## 2025-08-13 PROCEDURE — G8427 DOCREV CUR MEDS BY ELIG CLIN: HCPCS | Performed by: INTERNAL MEDICINE

## 2025-08-13 PROCEDURE — 86800 THYROGLOBULIN ANTIBODY: CPT

## 2025-08-13 PROCEDURE — 84481 FREE ASSAY (FT-3): CPT

## 2025-08-13 PROCEDURE — 84439 ASSAY OF FREE THYROXINE: CPT

## 2025-08-13 PROCEDURE — 36415 COLL VENOUS BLD VENIPUNCTURE: CPT

## 2025-08-13 PROCEDURE — 1123F ACP DISCUSS/DSCN MKR DOCD: CPT | Performed by: INTERNAL MEDICINE

## 2025-08-13 PROCEDURE — 3077F SYST BP >= 140 MM HG: CPT | Performed by: INTERNAL MEDICINE

## 2025-08-13 PROCEDURE — 1160F RVW MEDS BY RX/DR IN RCRD: CPT | Performed by: INTERNAL MEDICINE

## 2025-08-13 PROCEDURE — 3017F COLORECTAL CA SCREEN DOC REV: CPT | Performed by: INTERNAL MEDICINE

## 2025-08-13 PROCEDURE — 99204 OFFICE O/P NEW MOD 45 MIN: CPT | Performed by: INTERNAL MEDICINE

## 2025-08-13 PROCEDURE — 86376 MICROSOMAL ANTIBODY EACH: CPT

## 2025-08-13 PROCEDURE — 84443 ASSAY THYROID STIM HORMONE: CPT

## 2025-08-13 PROCEDURE — 3078F DIAST BP <80 MM HG: CPT | Performed by: INTERNAL MEDICINE

## 2025-08-14 LAB — T3FREE SERPL-MCNC: 4.9 PG/ML (ref 2–4.4)

## 2025-08-15 LAB
THYROGLOBULIN ANTIBODY: 73 IU/ML (ref 0–40)
THYROPEROXIDASE AB SERPL IA-ACNC: 21 IU/ML (ref 0–25)

## 2025-08-20 DIAGNOSIS — F41.1 GENERALIZED ANXIETY DISORDER: ICD-10-CM

## 2025-08-20 RX ORDER — BUSPIRONE HYDROCHLORIDE 15 MG/1
15 TABLET ORAL 3 TIMES DAILY
Qty: 270 TABLET | Refills: 1 | Status: SHIPPED | OUTPATIENT
Start: 2025-08-20

## 2025-08-23 ENCOUNTER — CLINICAL DOCUMENTATION (OUTPATIENT)
Age: 70
End: 2025-08-23

## 2025-08-23 DIAGNOSIS — E03.9 ACQUIRED HYPOTHYROIDISM: Primary | ICD-10-CM

## 2025-08-27 ENCOUNTER — HOSPITAL ENCOUNTER (OUTPATIENT)
Dept: GENERAL RADIOLOGY | Age: 70
Discharge: HOME | End: 2025-08-27
Payer: MEDICARE

## 2025-08-27 DIAGNOSIS — E03.9 ACQUIRED HYPOTHYROIDISM: ICD-10-CM

## 2025-08-27 LAB
T3FREE SERPL-MCNC: 4.05 PG/ML (ref 2–4.4)
T4 FREE SERPL-MCNC: 0.8 NG/DL (ref 0.92–1.68)
TSH SERPL DL<=0.05 MIU/L-ACNC: 1.17 UIU/ML (ref 0.27–4.2)

## 2025-08-27 PROCEDURE — 36415 COLL VENOUS BLD VENIPUNCTURE: CPT

## 2025-08-27 PROCEDURE — 84439 ASSAY OF FREE THYROXINE: CPT

## 2025-08-27 PROCEDURE — 84481 FREE ASSAY (FT-3): CPT

## 2025-08-27 PROCEDURE — 84443 ASSAY THYROID STIM HORMONE: CPT
